# Patient Record
Sex: FEMALE | Race: BLACK OR AFRICAN AMERICAN | NOT HISPANIC OR LATINO | URBAN - METROPOLITAN AREA
[De-identification: names, ages, dates, MRNs, and addresses within clinical notes are randomized per-mention and may not be internally consistent; named-entity substitution may affect disease eponyms.]

---

## 2018-05-01 ENCOUNTER — OFFICE VISIT CONVERTED (OUTPATIENT)
Dept: FAMILY MEDICINE CLINIC | Facility: CLINIC | Age: 33
End: 2018-05-01
Attending: NURSE PRACTITIONER

## 2018-06-22 ENCOUNTER — OFFICE VISIT CONVERTED (OUTPATIENT)
Dept: FAMILY MEDICINE CLINIC | Facility: CLINIC | Age: 33
End: 2018-06-22
Attending: NURSE PRACTITIONER

## 2018-11-27 ENCOUNTER — OFFICE VISIT CONVERTED (OUTPATIENT)
Dept: FAMILY MEDICINE CLINIC | Facility: CLINIC | Age: 33
End: 2018-11-27
Attending: NURSE PRACTITIONER

## 2019-01-22 ENCOUNTER — HOSPITAL ENCOUNTER (OUTPATIENT)
Dept: URGENT CARE | Facility: CLINIC | Age: 34
Discharge: HOME | End: 2019-01-22
Attending: FAMILY MEDICINE

## 2019-02-12 ENCOUNTER — HOSPITAL ENCOUNTER (OUTPATIENT)
Dept: URGENT CARE | Facility: CLINIC | Age: 34
Discharge: HOME | End: 2019-02-12
Attending: EMERGENCY MEDICINE

## 2019-02-14 LAB — BACTERIA UR CULT: NORMAL

## 2019-04-28 ENCOUNTER — HOSPITAL ENCOUNTER (OUTPATIENT)
Dept: URGENT CARE | Facility: CLINIC | Age: 34
Discharge: HOME | End: 2019-04-28

## 2019-09-23 ENCOUNTER — HOSPITAL ENCOUNTER (OUTPATIENT)
Dept: OTHER | Facility: HOSPITAL | Age: 34
Discharge: HOME | End: 2019-09-23
Attending: PHYSICIAN ASSISTANT

## 2019-11-20 ENCOUNTER — HOSPITAL ENCOUNTER (OUTPATIENT)
Dept: FAMILY MEDICINE CLINIC | Facility: CLINIC | Age: 34
Discharge: HOME | End: 2019-11-20
Attending: NURSE PRACTITIONER

## 2019-11-20 ENCOUNTER — OFFICE VISIT CONVERTED (OUTPATIENT)
Dept: FAMILY MEDICINE CLINIC | Facility: CLINIC | Age: 34
End: 2019-11-20
Attending: NURSE PRACTITIONER

## 2019-11-25 LAB
CONV LAST MENSTURAL PERIOD: NORMAL
SPECIMEN SOURCE: NORMAL
SPECIMEN SOURCE: NORMAL
THIN PREP CVX: NORMAL

## 2019-12-31 ENCOUNTER — HOSPITAL ENCOUNTER (OUTPATIENT)
Dept: FAMILY MEDICINE CLINIC | Facility: CLINIC | Age: 34
Discharge: HOME | End: 2019-12-31
Attending: NURSE PRACTITIONER

## 2019-12-31 LAB
25(OH)D3 SERPL-MCNC: 13.1 NG/ML (ref 30–100)
ALBUMIN SERPL-MCNC: 4 G/DL (ref 3.5–5)
ALBUMIN/GLOB SERPL: 1.2 {RATIO} (ref 1.4–2.6)
ALP SERPL-CCNC: 61 U/L (ref 42–98)
ALT SERPL-CCNC: 14 U/L (ref 10–40)
ANION GAP SERPL CALC-SCNC: 15 MMOL/L (ref 8–19)
AST SERPL-CCNC: 16 U/L (ref 15–50)
BASOPHILS # BLD AUTO: 0.06 10*3/UL (ref 0–0.2)
BASOPHILS NFR BLD AUTO: 0.9 % (ref 0–3)
BILIRUB SERPL-MCNC: 0.56 MG/DL (ref 0.2–1.3)
BUN SERPL-MCNC: 7 MG/DL (ref 5–25)
BUN/CREAT SERPL: 10 {RATIO} (ref 6–20)
CALCIUM SERPL-MCNC: 8.9 MG/DL (ref 8.7–10.4)
CHLORIDE SERPL-SCNC: 101 MMOL/L (ref 99–111)
CHOLEST SERPL-MCNC: 77 MG/DL (ref 107–200)
CHOLEST/HDLC SERPL: 2.2 {RATIO} (ref 3–6)
CONV ABS IMM GRAN: 0.01 10*3/UL (ref 0–0.2)
CONV CO2: 25 MMOL/L (ref 22–32)
CONV IMMATURE GRAN: 0.2 % (ref 0–1.8)
CONV TOTAL PROTEIN: 7.4 G/DL (ref 6.3–8.2)
CREAT UR-MCNC: 0.72 MG/DL (ref 0.5–0.9)
DEPRECATED RDW RBC AUTO: 41.3 FL (ref 36.4–46.3)
EOSINOPHIL # BLD AUTO: 0.29 10*3/UL (ref 0–0.7)
EOSINOPHIL # BLD AUTO: 4.5 % (ref 0–7)
ERYTHROCYTE [DISTWIDTH] IN BLOOD BY AUTOMATED COUNT: 11.9 % (ref 11.7–14.4)
GFR SERPLBLD BASED ON 1.73 SQ M-ARVRAT: >60 ML/MIN/{1.73_M2}
GLOBULIN UR ELPH-MCNC: 3.4 G/DL (ref 2–3.5)
GLUCOSE SERPL-MCNC: 104 MG/DL (ref 65–99)
HCT VFR BLD AUTO: 40 % (ref 37–47)
HDLC SERPL-MCNC: 35 MG/DL (ref 40–60)
HGB BLD-MCNC: 13 G/DL (ref 12–16)
LDLC SERPL CALC-MCNC: 33 MG/DL (ref 70–100)
LYMPHOCYTES # BLD AUTO: 2.82 10*3/UL (ref 1–5)
LYMPHOCYTES NFR BLD AUTO: 43.7 % (ref 20–45)
MCH RBC QN AUTO: 30.1 PG (ref 27–31)
MCHC RBC AUTO-ENTMCNC: 32.5 G/DL (ref 33–37)
MCV RBC AUTO: 92.6 FL (ref 81–99)
MONOCYTES # BLD AUTO: 0.33 10*3/UL (ref 0.2–1.2)
MONOCYTES NFR BLD AUTO: 5.1 % (ref 3–10)
NEUTROPHILS # BLD AUTO: 2.94 10*3/UL (ref 2–8)
NEUTROPHILS NFR BLD AUTO: 45.6 % (ref 30–85)
NRBC CBCN: 0 % (ref 0–0.7)
OSMOLALITY SERPL CALC.SUM OF ELEC: 282 MOSM/KG (ref 273–304)
PLATELET # BLD AUTO: 357 10*3/UL (ref 130–400)
PMV BLD AUTO: 9.7 FL (ref 9.4–12.3)
POTASSIUM SERPL-SCNC: 3.7 MMOL/L (ref 3.5–5.3)
RBC # BLD AUTO: 4.32 10*6/UL (ref 4.2–5.4)
SODIUM SERPL-SCNC: 137 MMOL/L (ref 135–147)
TRIGL SERPL-MCNC: 46 MG/DL (ref 40–150)
TSH SERPL-ACNC: 1.72 M[IU]/L (ref 0.27–4.2)
VLDLC SERPL-MCNC: 9 MG/DL (ref 5–37)
WBC # BLD AUTO: 6.45 10*3/UL (ref 4.8–10.8)

## 2020-01-15 ENCOUNTER — HOSPITAL ENCOUNTER (OUTPATIENT)
Dept: URGENT CARE | Facility: CLINIC | Age: 35
Discharge: HOME | End: 2020-01-15
Attending: NURSE PRACTITIONER

## 2020-01-23 ENCOUNTER — HOSPITAL ENCOUNTER (OUTPATIENT)
Dept: CT IMAGING | Facility: HOSPITAL | Age: 35
Discharge: HOME | End: 2020-01-23
Attending: OBSTETRICS & GYNECOLOGY

## 2020-05-28 ENCOUNTER — HOSPITAL ENCOUNTER (OUTPATIENT)
Dept: FAMILY MEDICINE CLINIC | Facility: CLINIC | Age: 35
Discharge: HOME | End: 2020-05-28
Attending: NURSE PRACTITIONER

## 2020-05-28 ENCOUNTER — OFFICE VISIT CONVERTED (OUTPATIENT)
Dept: FAMILY MEDICINE CLINIC | Facility: CLINIC | Age: 35
End: 2020-05-28
Attending: NURSE PRACTITIONER

## 2020-05-28 LAB
C TRACH RRNA CVX QL NAA+PROBE: NOT DETECTED
N GONORRHOEA DNA SPEC QL NAA+PROBE: NOT DETECTED

## 2020-06-22 ENCOUNTER — HOSPITAL ENCOUNTER (OUTPATIENT)
Dept: URGENT CARE | Facility: CLINIC | Age: 35
Discharge: HOME | End: 2020-06-22
Attending: EMERGENCY MEDICINE

## 2020-07-07 LAB
CREAT BLD-MCNC: 0.8 MG/DL (ref 0.6–1.4)
GFR SERPLBLD BASED ON 1.73 SQ M-ARVRAT: >60 ML/MIN/{1.73_M2}

## 2020-07-14 ENCOUNTER — HOSPITAL ENCOUNTER (OUTPATIENT)
Dept: URGENT CARE | Facility: CLINIC | Age: 35
Discharge: HOME | End: 2020-07-14
Attending: PHYSICIAN ASSISTANT

## 2020-08-04 ENCOUNTER — HOSPITAL ENCOUNTER (OUTPATIENT)
Dept: MRI IMAGING | Facility: HOSPITAL | Age: 35
Discharge: HOME | End: 2020-08-04
Attending: INTERNAL MEDICINE

## 2020-08-17 ENCOUNTER — HOSPITAL ENCOUNTER (OUTPATIENT)
Dept: FAMILY MEDICINE CLINIC | Facility: CLINIC | Age: 35
Discharge: HOME | End: 2020-08-17
Attending: NURSE PRACTITIONER

## 2020-08-17 ENCOUNTER — OFFICE VISIT CONVERTED (OUTPATIENT)
Dept: FAMILY MEDICINE CLINIC | Facility: CLINIC | Age: 35
End: 2020-08-17
Attending: NURSE PRACTITIONER

## 2020-08-17 LAB
C TRACH RRNA CVX QL NAA+PROBE: NOT DETECTED
N GONORRHOEA DNA SPEC QL NAA+PROBE: NOT DETECTED

## 2020-08-18 LAB
ALBUMIN SERPL-MCNC: 4.2 G/DL (ref 3.5–5)
ALBUMIN/GLOB SERPL: 1.2 {RATIO} (ref 1.4–2.6)
ALP SERPL-CCNC: 58 U/L (ref 42–98)
ALT SERPL-CCNC: 24 U/L (ref 10–40)
ANION GAP SERPL CALC-SCNC: 17 MMOL/L (ref 8–19)
AST SERPL-CCNC: 27 U/L (ref 15–50)
BASOPHILS # BLD AUTO: 0.03 10*3/UL (ref 0–0.2)
BASOPHILS NFR BLD AUTO: 0.4 % (ref 0–3)
BILIRUB SERPL-MCNC: 0.42 MG/DL (ref 0.2–1.3)
BUN SERPL-MCNC: 7 MG/DL (ref 5–25)
BUN/CREAT SERPL: 9 {RATIO} (ref 6–20)
CALCIUM SERPL-MCNC: 9.6 MG/DL (ref 8.7–10.4)
CHLORIDE SERPL-SCNC: 102 MMOL/L (ref 99–111)
CHOLEST SERPL-MCNC: 83 MG/DL (ref 107–200)
CHOLEST/HDLC SERPL: 2.7 {RATIO} (ref 3–6)
CONV ABS IMM GRAN: 0.01 10*3/UL (ref 0–0.2)
CONV CO2: 25 MMOL/L (ref 22–32)
CONV IMMATURE GRAN: 0.1 % (ref 0–1.8)
CONV TOTAL PROTEIN: 7.7 G/DL (ref 6.3–8.2)
CREAT UR-MCNC: 0.74 MG/DL (ref 0.5–0.9)
DEPRECATED RDW RBC AUTO: 41.2 FL (ref 36.4–46.3)
EOSINOPHIL # BLD AUTO: 0.2 10*3/UL (ref 0–0.7)
EOSINOPHIL # BLD AUTO: 2.8 % (ref 0–7)
ERYTHROCYTE [DISTWIDTH] IN BLOOD BY AUTOMATED COUNT: 11.9 % (ref 11.7–14.4)
GFR SERPLBLD BASED ON 1.73 SQ M-ARVRAT: >60 ML/MIN/{1.73_M2}
GLOBULIN UR ELPH-MCNC: 3.5 G/DL (ref 2–3.5)
GLUCOSE SERPL-MCNC: 96 MG/DL (ref 65–99)
HCT VFR BLD AUTO: 42.5 % (ref 37–47)
HDLC SERPL-MCNC: 31 MG/DL (ref 40–60)
HGB BLD-MCNC: 13.5 G/DL (ref 12–16)
LDLC SERPL CALC-MCNC: 45 MG/DL (ref 70–100)
LYMPHOCYTES # BLD AUTO: 3.09 10*3/UL (ref 1–5)
LYMPHOCYTES NFR BLD AUTO: 43.6 % (ref 20–45)
MCH RBC QN AUTO: 30.2 PG (ref 27–31)
MCHC RBC AUTO-ENTMCNC: 31.8 G/DL (ref 33–37)
MCV RBC AUTO: 95.1 FL (ref 81–99)
MONOCYTES # BLD AUTO: 0.4 10*3/UL (ref 0.2–1.2)
MONOCYTES NFR BLD AUTO: 5.6 % (ref 3–10)
NEUTROPHILS # BLD AUTO: 3.35 10*3/UL (ref 2–8)
NEUTROPHILS NFR BLD AUTO: 47.5 % (ref 30–85)
NRBC CBCN: 0 % (ref 0–0.7)
OSMOLALITY SERPL CALC.SUM OF ELEC: 288 MOSM/KG (ref 273–304)
PLATELET # BLD AUTO: 338 10*3/UL (ref 130–400)
PMV BLD AUTO: 9.8 FL (ref 9.4–12.3)
POTASSIUM SERPL-SCNC: 3.8 MMOL/L (ref 3.5–5.3)
RBC # BLD AUTO: 4.47 10*6/UL (ref 4.2–5.4)
SODIUM SERPL-SCNC: 140 MMOL/L (ref 135–147)
TRIGL SERPL-MCNC: 37 MG/DL (ref 40–150)
TSH SERPL-ACNC: 1.8 M[IU]/L (ref 0.27–4.2)
VLDLC SERPL-MCNC: 7 MG/DL (ref 5–37)
WBC # BLD AUTO: 7.08 10*3/UL (ref 4.8–10.8)

## 2020-09-02 ENCOUNTER — HOSPITAL ENCOUNTER (OUTPATIENT)
Dept: URGENT CARE | Facility: CLINIC | Age: 35
Discharge: HOME | End: 2020-09-02
Attending: NURSE PRACTITIONER

## 2020-10-26 ENCOUNTER — HOSPITAL ENCOUNTER (OUTPATIENT)
Dept: URGENT CARE | Facility: CLINIC | Age: 35
Discharge: HOME | End: 2020-10-26

## 2020-10-26 LAB
CONV HIV-1/ HIV-2: NONREACTIVE
RPR SER QL: NORMAL

## 2020-10-27 LAB
C TRACH RRNA CVX QL NAA+PROBE: NOT DETECTED
N GONORRHOEA DNA SPEC QL NAA+PROBE: NOT DETECTED

## 2020-10-28 LAB
CONV HEPATITIS B SURFACE AG W CONFIRMATION RE: NEGATIVE
HAV IGM SERPL QL IA: NEGATIVE
HBV CORE IGM SERPL QL IA: NEGATIVE
HCV AB SER DONR QL: <0.1 S/CO RATIO (ref 0–0.9)

## 2020-11-18 ENCOUNTER — HOSPITAL ENCOUNTER (OUTPATIENT)
Dept: URGENT CARE | Facility: CLINIC | Age: 35
Discharge: HOME | End: 2020-11-18
Attending: NURSE PRACTITIONER

## 2020-11-20 LAB — SARS-COV-2 RNA SPEC QL NAA+PROBE: NOT DETECTED

## 2021-03-15 ENCOUNTER — HOSPITAL ENCOUNTER (OUTPATIENT)
Dept: URGENT CARE | Facility: CLINIC | Age: 36
Discharge: HOME | End: 2021-03-15
Attending: EMERGENCY MEDICINE

## 2021-04-21 ENCOUNTER — HOSPITAL ENCOUNTER (OUTPATIENT)
Dept: URGENT CARE | Facility: CLINIC | Age: 36
Discharge: HOME | End: 2021-04-21
Attending: FAMILY MEDICINE

## 2021-04-22 LAB — SARS-COV-2 RNA SPEC QL NAA+PROBE: DETECTED

## 2021-05-13 NOTE — PROGRESS NOTES
Progress Note      Patient Name: Renny Cole   Patient ID: [id number]   Sex: Female   YOB: 1985    Primary Care Provider: Pippa SCALES   Referring Provider: Pippa SCALES    Visit Date: August 17, 2020    Provider: LOYDA Olea   Location: [facility]   Location Address: [address] [place] [address], [address]  [id number]   Location Phone: [phone]          Chief Complaint  · follow-up  · vaginal discharge      History Of Present Illness  Renny Cole is a 35 year old /Black female who presents for evaluation and treatment of:      Patient is a 35-year-old female who comes in for annual physical exam.  Last labs were done December 2019 at that time there was no concerning issues.  Patient is never smoked.  She is up-to-date on her depression screening, no other preventative screenings at this time.  She eats a well-balanced diet, she is active on a daily basis.  Blood pressure is elevated today at 142/96, she denies any headache, chest pain, or change in vision.  She states that she is anxious today, she has the ability to check her blood pressure at home and will do that if she notices more elevated than not she will make an appointment.  This is an outlier blood pressure she typically runs within normal limits.    Patient is complaining of new vaginal discharge for 1 week.  She states she has had a new sexual partner and the condom was broken she is concerned of STDs.  She denies any dysuria urgency or frequency.  She denies any burning or itching in the vaginal region.  She states the vaginal discharge is thick and white.  She denies abdominal pain, fever, or chills.       Past Medical History  Disease Name Date Onset Notes   Asthma 03/14/2017 --    Depression --  --    Essential hypertension 03/14/2017 --    Hypertension --  --    STD (female) --  genital herpes, trichomoniasis         Medication List  Name Date Started Instructions   acyclovir  "400 mg oral tablet 2020 take 1 tablet by oral route daily for 90 days   hydrochlorothiazide 25 mg oral tablet 2019 take 1 tablet (25 mg) by oral route once daily for 90 days   Prenatal 28 mg iron- 800 mcg oral tablet 2018 take 1 tablet by oral route daily   Ventolin HFA 90 mcg/actuation inhalation HFA aerosol inhaler 2017 inhale 1 - 2 puffs (90 - 180 mcg) by inhalation route every 4-6 hours as needed         Allergy List  Allergen Name Date Reaction Notes   NO KNOWN DRUG ALLERGIES --  --  --          Reproductive History  Menstrual   Last Menstrual Period: 2016   Pregnancy Summary   Total Pregnancies: 0 Full Term: 0 Premature: 0   Ab Induced: 0 Ab Spontaneous: 0 Ectopics: 0   Multiples: 0 Livin         Social History  Finding Status Start/Stop Quantity Notes   Alcohol Never --/-- --  --    Tobacco Never --/-- --  --          Review of Systems  · Constitutional  o Denies  o : fever, fatigue, weight loss, weight gain  · Eyes  o Denies  o : double vision, impaired vision, blurred vision  · HENT  o Denies  o : headaches, vertigo  · Cardiovascular  o Denies  o : lower extremity edema, claudication, chest pressure, palpitations  · Respiratory  o Denies  o : shortness of breath, wheezing, cough, hemoptysis, dyspnea on exertion  · Gastrointestinal  o Denies  o : nausea, vomiting, diarrhea, constipation, abdominal pain  · Genitourinary  o Admits  o : vaginal discharge  o Denies  o : urgency, frequency, dysuria  · Integument  o Denies  o : rash, itching, pigmentation changes  · Musculoskeletal  o Denies  o : joint pain, joint swelling, muscle pain, muscular weakness  · Psychiatric  o Denies  o : anxiety, depression, suicidal ideation, homicidal ideation      Vitals  Date Time BP Position Site L\R Cuff Size HR RR TEMP (F) WT  HT  BMI kg/m2 BSA m2 O2 Sat HC       2020 08:23 /96 Sitting    84 - R  97.5 217lbs 0oz 5'  8\" 32.99 2.17 98 %          Physical " Examination  · Constitutional  o Appearance  o : well-nourished, well developed, in no acute distress  · Eyes  o Conjunctivae  o : conjunctivae normal, no exudates present  o Sclerae  o : sclerae white  o Pupils and Irises  o : pupils equal and round, and reactive to light and accomodation bilaterally  o Eyelids/Ocular Adnexae  o : extra ocular movements intact  · Respiratory  o Respiratory Effort  o : breathing unlabored, no accessory muscle use  o Inspection of Chest  o : normal appearance, no retractions  o Auscultation of Lungs  o : normal breath sounds bilaterally  · Cardiovascular  o Heart  o :   § Auscultation of Heart  § : regular rate and rhythm, no murmurs, gallops or rubs  o Peripheral Vascular System  o :   § Extremities  § : no edema  · Gastrointestinal  o Abdominal Examination  o : soft, non tender, non distended, with no gaurding, rebound, or rigidity, normal sounds, no masses present, no CVA tenderness  · Neurologic  o Mental Status Examination  o :   § Orientation  § : alert and oriented x3  § Speech/Language  § : normal speech pattern  o Gait and Station  o : normal gait, able to stand without difficulty  · Psychiatric  o Judgment and Insight  o : judgment and insight intact, judgement for everyday activities and social situations within normal limits, insight intact  o Thought Processes  o : rate of thoughts normal, thought content logical  o Mood and Affect  o : mood normal, affect appropriate              Assessment  · Annual physical exam     V70.0/Z00.00  · Vaginal discharge     623.5/N89.8  Will do STD checks  · Elevated blood pressure reading     796.2/R03.0  We will continue to monitor, patient is completely asymptomatic, this is an outlier patient will check at home if starts to become an issue will make an appointment. Patient given guidelines of blood pressure readings.    Problems Reconciled  Plan  · Orders  o Physical, Primary Care Panel (CBC, CMP, Lipid, TSH) [facility] (08312, 12396, 96309,  15769) - 623.5/N89.8, V70.0/Z00.00 - 08/17/2020  o ACO-39: Current medications updated and reviewed () - - 08/17/2020  o GC/Chlamydia by PCR (Urine or Vaginal Swab) [facility] ([facility]) - 623.5/N89.8 - 08/17/2020  o Vaginal pathogens DNA detection panel (66275, 39283, 96838) - 623.5/N89.8 - 08/17/2020  · Medications  o Medications have been Reconciled  o Transition of Care or Provider Policy  · Instructions  o Reviewed health maintenance flowsheet and updated information. Orders were placed and/or patient's response was documented.  o Take all medications as prescribed/directed.  o Patient was educated/instructed on their diagnosis, treatment and medications prior to discharge from the clinic today.  o Call the office with any concerns or questions.  · Disposition  o Call or Return if symptoms worsen or persist.  o Follow up 1 year  o follow up as needed  o call the office with any questions or concerns            Electronically Signed by: LOYDA Olea -Author on August 17, 2020 09:16:57 AM

## 2021-05-13 NOTE — PROGRESS NOTES
Progress Note      Patient Name: Renny Cole   Patient ID: [id number]   Sex: Female   YOB: 1985    Primary Care Provider: Pippa SCALES   Referring Provider: Pippa SCALES    Visit Date: May 28, 2020    Provider: LOYDA Olea   Location: [facility]   Location Address: [address] [place] [address], [address] [place] [address]  [id number]   Location Phone: [phone]          Chief Complaint  · std testing, vag screen      History Of Present Illness  Renny Cole is a 34 year old /Black female who presents for evaluation and treatment of:      Patient is a 34-year-old female who comes in today wanting an STD check vaginal exam.  She has a new partner, she states that they do have unprotected sex.  She denies any new vaginal discharge, lower abdominal pain, dysuria, urgency, or frequency.  She has no other complaints at this time.  Last Pap smear was 11/20/2019 it was normal at that time.       Past Medical History  Disease Name Date Onset Notes   Asthma 03/14/2017 --    Depression --  --    Essential hypertension 03/14/2017 --    Hypertension --  --    STD (female) --  genital herpes, trichomoniasis         Medication List  Name Date Started Instructions   acyclovir 400 mg oral tablet 04/23/2020 TAKE 1 TABLET BY MOUTH EVERY 8 HOURS   hydrochlorothiazide 25 mg oral tablet 11/20/2019 take 1 tablet (25 mg) by oral route once daily for 90 days   Prenatal 28 mg iron- 800 mcg oral tablet 05/01/2018 take 1 tablet by oral route daily   Ventolin HFA 90 mcg/actuation inhalation HFA aerosol inhaler 03/14/2017 inhale 1 - 2 puffs (90 - 180 mcg) by inhalation route every 4-6 hours as needed   Vitamin D2 50,000 unit oral capsule 01/02/2020 take 1 capsule (50,000 unit) by oral route once weekly for 120 days         Allergy List  Allergen Name Date Reaction Notes   NO KNOWN DRUG ALLERGIES --  --  --          Reproductive History  Menstrual   Last Menstrual Period: 06/29/2016   Pregnancy  "Summary   Total Pregnancies: 0 Full Term: 0 Premature: 0   Ab Induced: 0 Ab Spontaneous: 0 Ectopics: 0   Multiples: 0 Livin         Social History  Finding Status Start/Stop Quantity Notes   Alcohol Never --/-- --  --    Tobacco Never --/-- --  --          Review of Systems  · Constitutional  o Denies  o : fever, fatigue, weight loss, weight gain  · Cardiovascular  o Denies  o : lower extremity edema, claudication, chest pressure, palpitations  · Respiratory  o Denies  o : shortness of breath, wheezing, cough, hemoptysis, dyspnea on exertion  · Gastrointestinal  o Denies  o : nausea, vomiting, diarrhea, constipation, abdominal pain  · Genitourinary  o Denies  o : urgency, frequency, dysuria, vaginal discharge      Vitals  Date Time BP Position Site L\R Cuff Size HR RR TEMP (F) WT  HT  BMI kg/m2 BSA m2 O2 Sat        2020 09:22 /88 Sitting    79 - R  97.7 216lbs 2oz 5'  8\" 32.86 2.17 97 %          Physical Examination  · Constitutional  o Appearance  o : well-nourished, well developed, in no acute distress  · Eyes  o Conjunctivae  o : conjunctivae normal, no exudates present  o Sclerae  o : sclerae white  o Pupils and Irises  o : pupils equal and round, and reactive to light and accomodation bilaterally  o Eyelids/Ocular Adnexae  o : extra ocular movements intact  · Respiratory  o Respiratory Effort  o : breathing unlabored, no accessory muscle use  o Inspection of Chest  o : normal appearance, no retractions  o Auscultation of Lungs  o : normal breath sounds bilaterally  · Cardiovascular  o Heart  o :   § Auscultation of Heart  § : regular rate and rhythm, no murmurs, gallops or rubs  · Genitourinary  o FEMALE  EXAM:  o :   §  and rectal exam deferred  § :  and rectal exam deferred  o External Genitalia  o : no inflammation, no lesions present  o Vagina  o : normal vaginal vault, no discharge present, no inflammatory lesions present, no masses present  o Urethra  o :   § Urethral Meatus  § : " no inflammation present  § Urethral Body  § : urethra palpation normal  o Bladder  o : nontender to palpation  o Cervix  o : appearance healthy, no lesions present, nontender to palpation, no discharges, no bleeding present, normal midline position  o Uterus  o : nontender to palpation, no masses present, position midline/midplane  o Adnexa  o : no tenderness or masses present on bimanual examination  o Anus  o : no inflammation or lesions present              Assessment  · STD exposure     V01.6/Z20.2    Problems Reconciled  Plan  · Orders  o Vag Screen Infectious agent detection by nucleic acid DNA or RNA (96417) - V01.6/Z20.2 - 05/28/2020  o GC/Chlamydia by PCR (Urine or Vaginal Swab) [facility] ([facility]) - V01.6/Z20.2 - 05/28/2020  o ACO-39: Current medications updated and reviewed () - - 05/28/2020  o ACO-18: Positive screen for clinical depression using a standardized tool and a follow-up plan documented () - - 05/28/2020   positive 8 pts.  · Instructions  o Patient was educated/instructed on their diagnosis, treatment and medications prior to discharge from the clinic today.  o Call the office with any concerns or questions.  · Disposition  o Call or Return if symptoms worsen or persist.  o follow up as needed  o call the office with any questions or concerns            Electronically Signed by: Pippa Yeboah APRN -Author on May 28, 2020 10:07:44 AM

## 2021-05-15 VITALS
SYSTOLIC BLOOD PRESSURE: 130 MMHG | WEIGHT: 216.12 LBS | TEMPERATURE: 97.7 F | OXYGEN SATURATION: 97 % | BODY MASS INDEX: 32.75 KG/M2 | HEART RATE: 79 BPM | DIASTOLIC BLOOD PRESSURE: 88 MMHG | HEIGHT: 68 IN

## 2021-05-15 VITALS
TEMPERATURE: 98.5 F | OXYGEN SATURATION: 97 % | DIASTOLIC BLOOD PRESSURE: 88 MMHG | WEIGHT: 213.25 LBS | HEART RATE: 82 BPM | SYSTOLIC BLOOD PRESSURE: 144 MMHG | BODY MASS INDEX: 32.32 KG/M2 | HEIGHT: 68 IN

## 2021-05-15 VITALS
TEMPERATURE: 97.5 F | BODY MASS INDEX: 32.89 KG/M2 | DIASTOLIC BLOOD PRESSURE: 96 MMHG | OXYGEN SATURATION: 98 % | SYSTOLIC BLOOD PRESSURE: 142 MMHG | HEIGHT: 68 IN | HEART RATE: 84 BPM | WEIGHT: 217 LBS

## 2021-05-16 VITALS
DIASTOLIC BLOOD PRESSURE: 88 MMHG | HEART RATE: 79 BPM | SYSTOLIC BLOOD PRESSURE: 132 MMHG | BODY MASS INDEX: 32.05 KG/M2 | TEMPERATURE: 98.4 F | RESPIRATION RATE: 16 BRPM | HEIGHT: 68 IN | OXYGEN SATURATION: 98 % | WEIGHT: 211.5 LBS

## 2021-05-16 VITALS
OXYGEN SATURATION: 96 % | TEMPERATURE: 98.3 F | BODY MASS INDEX: 31.24 KG/M2 | WEIGHT: 206.12 LBS | DIASTOLIC BLOOD PRESSURE: 80 MMHG | RESPIRATION RATE: 16 BRPM | SYSTOLIC BLOOD PRESSURE: 128 MMHG | HEIGHT: 68 IN | HEART RATE: 81 BPM

## 2021-05-16 VITALS
OXYGEN SATURATION: 99 % | SYSTOLIC BLOOD PRESSURE: 118 MMHG | HEART RATE: 81 BPM | WEIGHT: 214.37 LBS | BODY MASS INDEX: 32.49 KG/M2 | DIASTOLIC BLOOD PRESSURE: 86 MMHG | RESPIRATION RATE: 16 BRPM | TEMPERATURE: 98.5 F | HEIGHT: 68 IN

## 2021-07-20 RX ORDER — ACYCLOVIR 400 MG/1
TABLET ORAL
Qty: 90 TABLET | Refills: 0 | Status: SHIPPED | OUTPATIENT
Start: 2021-07-20 | End: 2021-10-12 | Stop reason: SDUPTHER

## 2021-08-12 PROCEDURE — U0003 INFECTIOUS AGENT DETECTION BY NUCLEIC ACID (DNA OR RNA); SEVERE ACUTE RESPIRATORY SYNDROME CORONAVIRUS 2 (SARS-COV-2) (CORONAVIRUS DISEASE [COVID-19]), AMPLIFIED PROBE TECHNIQUE, MAKING USE OF HIGH THROUGHPUT TECHNOLOGIES AS DESCRIBED BY CMS-2020-01-R: HCPCS | Performed by: PHYSICIAN ASSISTANT

## 2021-08-14 ENCOUNTER — TELEPHONE (OUTPATIENT)
Dept: URGENT CARE | Facility: CLINIC | Age: 36
End: 2021-08-14

## 2021-08-14 NOTE — TELEPHONE ENCOUNTER
Unable to reach patient at phone numbers listed in chart.  If you are unable to find another number, please send a certified letter for patient.

## 2021-09-16 ENCOUNTER — OFFICE VISIT (OUTPATIENT)
Dept: FAMILY MEDICINE CLINIC | Facility: CLINIC | Age: 36
End: 2021-09-16

## 2021-09-16 VITALS
BODY MASS INDEX: 32.34 KG/M2 | WEIGHT: 213.4 LBS | HEART RATE: 59 BPM | OXYGEN SATURATION: 100 % | DIASTOLIC BLOOD PRESSURE: 88 MMHG | SYSTOLIC BLOOD PRESSURE: 138 MMHG | TEMPERATURE: 96.7 F | HEIGHT: 68 IN

## 2021-09-16 DIAGNOSIS — Z01.419 ROUTINE GYNECOLOGICAL EXAMINATION: ICD-10-CM

## 2021-09-16 DIAGNOSIS — Z11.3 SCREENING FOR STDS (SEXUALLY TRANSMITTED DISEASES): Primary | ICD-10-CM

## 2021-09-16 LAB
C TRACH RRNA CVX QL NAA+PROBE: NOT DETECTED
CANDIDA SPECIES: NEGATIVE
GARDNERELLA VAGINALIS: POSITIVE
N GONORRHOEA RRNA SPEC QL NAA+PROBE: NOT DETECTED
T VAGINALIS DNA VAG QL PROBE+SIG AMP: POSITIVE

## 2021-09-16 PROCEDURE — 99395 PREV VISIT EST AGE 18-39: CPT | Performed by: NURSE PRACTITIONER

## 2021-09-16 PROCEDURE — G0148 SCR C/V CYTO, AUTOSYS, RESCR: HCPCS | Performed by: NURSE PRACTITIONER

## 2021-09-16 PROCEDURE — 2014F MENTAL STATUS ASSESS: CPT | Performed by: NURSE PRACTITIONER

## 2021-09-16 PROCEDURE — 87510 GARDNER VAG DNA DIR PROBE: CPT | Performed by: NURSE PRACTITIONER

## 2021-09-16 PROCEDURE — 87491 CHLMYD TRACH DNA AMP PROBE: CPT | Performed by: NURSE PRACTITIONER

## 2021-09-16 PROCEDURE — 87480 CANDIDA DNA DIR PROBE: CPT | Performed by: NURSE PRACTITIONER

## 2021-09-16 PROCEDURE — 87624 HPV HI-RISK TYP POOLED RSLT: CPT | Performed by: NURSE PRACTITIONER

## 2021-09-16 PROCEDURE — 3008F BODY MASS INDEX DOCD: CPT | Performed by: NURSE PRACTITIONER

## 2021-09-16 PROCEDURE — 87660 TRICHOMONAS VAGIN DIR PROBE: CPT | Performed by: NURSE PRACTITIONER

## 2021-09-16 PROCEDURE — 87591 N.GONORRHOEAE DNA AMP PROB: CPT | Performed by: NURSE PRACTITIONER

## 2021-09-16 RX ORDER — METRONIDAZOLE 500 MG/1
2000 TABLET ORAL ONCE
Qty: 4 TABLET | Refills: 0 | Status: SHIPPED | OUTPATIENT
Start: 2021-09-16 | End: 2021-09-16

## 2021-09-16 RX ORDER — METRONIDAZOLE 500 MG/1
500 TABLET ORAL 2 TIMES DAILY
Qty: 10 TABLET | Refills: 0 | Status: SHIPPED | OUTPATIENT
Start: 2021-09-17 | End: 2021-09-22

## 2021-09-16 NOTE — PROGRESS NOTES
"Chief Complaint  Gynecologic Exam    Subjective          Renny Cole presents to [facility]    ENCOUNTER DATE:  09/16/2021    Renny Cole / 36 y.o. / female      CHIEF COMPLAINT    Gynecologic Exam      VITALS    /88 (BP Location: Left arm, Patient Position: Sitting, Cuff Size: Adult)   Pulse 59   Temp 96.7 °F (35.9 °C) (Temporal)   Ht 172.7 cm (68\")   Wt 96.8 kg (213 lb 6.4 oz)   SpO2 100%   BMI 32.45 kg/m²     BP Readings from Last 3 Encounters:  09/16/21 : 138/88  08/12/21 : 140/86  08/17/20 : 142/96    Wt Readings from Last 3 Encounters:  09/16/21 : 96.8 kg (213 lb 6.4 oz)  08/12/21 : 85.7 kg (189 lb)  08/17/20 : 98.4 kg (217 lb)    Body mass index is 32.45 kg/m².    MEDICATIONS    Current Outpatient Medications on File Prior to Visit:  (DISCONTINUED) acyclovir (ZOVIRAX) 400 MG tablet, TAKE 1 TABLET BY MOUTH DAILY, Disp: 90 tablet, Rfl: 0  (DISCONTINUED) brompheniramine-pseudoephedrine-DM 30-2-10 MG/5ML syrup, Take 5 mL by mouth 4 (Four) Times a Day As Needed for Allergies., Disp: 118 mL, Rfl: 0    HISTORY OF PRESENT ILLNESS    Renny presents for annual health maintenance visit.    Last health maintenance visit: approximately 1 year ago  General health: good  Lifestyle:  Attempting to lose weight?: Yes   Diet: eats moderately healthy  Exercise: walks regularly  Tobacco: Never used   Alcohol: does not drink  Work: Full-time  Reproductive health:  Sexually active?: Yes   Sexual problems?: No problems  Concern for STD?: Yes    Sees Gynecologist?: No   Korin/Postmenopausal?: No   Domestic abuse concerns: No   Depression Screening:   No flowsheet data found.     PHQ-2: 0 (Not depressed)  PHQ-9: 0 (Negative screening for depression)    Patient Care Team:  Pippa Yeboah APRN as PCP - General (Nurse Practitioner)      ALLERGIES  No Known Allergies     PFSH:     The following portions of the patient's history were reviewed and updated as appropriate: " Allergies / Current Medications / Past Medical History / Surgical History / Social History / Family History    PROBLEM LIST   There is no problem list on file for this patient.      PAST MEDICAL HISTORY  Past Medical History:  03/14/2017: Asthma  No date: Depression  03/14/2017: Essential hypertension  No date: STD (female)     Comment:  GENITAL HERPES; TRICHOMONIASIS    SURGICAL HISTORY  No past surgical history on file.    SOCIAL HISTORY  Social History   Socioeconomic History     Marital status: Single     Spouse name: Not on file     Number of children: Not on file     Years of education: Not on file     Highest education level: Not on file   Tobacco Use     Smoking status: Never Smoker     Smokeless tobacco: Never Used   Vaping Use     Vaping Use: Never used   Substance and Sexual Activity     Alcohol use: Never     Drug use: Never        IMMUNIZATION HISTORY    Immunization History  Administered            Date(s) Administered   COVID-19 (PFIZER)     08/02/2021     MMR                   08/15/1996     Td                    04/12/1999          HEALTHCARE MAINTENANCE ISSUES    Cancer Screening:  Colon: Initial/Next screening at age: N/A  Repeat colon cancer screening: N/A at this time  Breast: Recommended monthly self exams; annual professional exam  Mammogram: N/A at this time  Cervical: 3 years  Skin: Monthly self skin examination, annual exam by health professional      Lifestyle Counseling:  Lifestyle Modifications: Continue good lifestyle choices/modifications  Safety Issues: Always wear seatbelt, Avoid texting while driving   Use sunscreen, regular skin examination  Recommended annual dental/vision examination.  Emotional/Stress/Sleep: Reviewed and  given when appropriate    Gynecologic Exam  The patient's pertinent negatives include no genital itching, genital rash or vaginal discharge. She is sexually active. It is unknown whether or not her partner has an STD. She uses nothing for contraception.  "Her menstrual history has been regular. Her past medical history is significant for menorrhagia. There is no history of an ectopic pregnancy or endometriosis.       Objective   Vital Signs:   /88 (BP Location: Left arm, Patient Position: Sitting, Cuff Size: Adult)   Pulse 59   Temp 96.7 °F (35.9 °C) (Temporal)   Ht 172.7 cm (68\")   Wt 96.8 kg (213 lb 6.4 oz)   SpO2 100%   BMI 32.45 kg/m²     Physical Exam  Vitals reviewed. Exam conducted with a chaperone present (Manda Barr).   Constitutional:       Appearance: Normal appearance. She is well-developed.   HENT:      Head: Normocephalic and atraumatic.   Eyes:      Conjunctiva/sclera: Conjunctivae normal.      Pupils: Pupils are equal, round, and reactive to light.   Cardiovascular:      Rate and Rhythm: Normal rate and regular rhythm.      Heart sounds: No murmur heard.   No friction rub.   Pulmonary:      Effort: Pulmonary effort is normal.      Breath sounds: Normal breath sounds. No wheezing or rhonchi.   Genitourinary:     Exam position: Knee-chest position.      Vagina: Normal.      Cervix: Normal.      Uterus: Normal.       Adnexa: Right adnexa normal and left adnexa normal.   Skin:     General: Skin is warm and dry.   Neurological:      Mental Status: She is alert and oriented to person, place, and time.   Psychiatric:         Mood and Affect: Mood and affect normal.         Behavior: Behavior normal.         Thought Content: Thought content normal.         Judgment: Judgment normal.        Result Review :   The following data was reviewed by: LOYDA Stephens on 09/16/2021:                          Assessment and Plan    Diagnoses and all orders for this visit:    1. Screening for STDs (sexually transmitted diseases) (Primary)  -     Cancel: Chlamydia trachomatis, Neisseria gonorrhoeae, PCR - Swab, Cervix; Future  -     Chlamydia trachomatis, Neisseria gonorrhoeae, PCR - Urine, Urine, Clean Catch  -     Gardnerella vaginalis, " Trichomonas vaginalis, Candida albicans, DNA - Swab, Vagina    2. Routine gynecological examination  -     IgP, Aptima HPV        Follow Up   No follow-ups on file.  Patient was given instructions and counseling regarding her condition or for health maintenance advice. Please see specific information pulled into the AVS if appropriate.

## 2021-09-20 LAB
CYTOLOGIST CVX/VAG CYTO: NORMAL
CYTOLOGY CVX/VAG DOC CYTO: NORMAL
CYTOLOGY CVX/VAG DOC THIN PREP: NORMAL
DX ICD CODE: NORMAL
HIV 1 & 2 AB SER-IMP: NORMAL
HPV I/H RISK 4 DNA CVX QL PROBE+SIG AMP: NEGATIVE
OTHER STN SPEC: NORMAL
STAT OF ADQ CVX/VAG CYTO-IMP: NORMAL

## 2021-09-23 ENCOUNTER — TELEPHONE (OUTPATIENT)
Dept: FAMILY MEDICINE CLINIC | Facility: CLINIC | Age: 36
End: 2021-09-23

## 2021-09-23 NOTE — TELEPHONE ENCOUNTER
Caller: Renny Cole    Relationship: Self    Best call back number: [phone]    What is the best time to reach you: ANYTIME    Who are you requesting to speak with (clinical staff, provider,  specific staff member): NURSE    What was the call regarding: PATIENT WOULD LIKE TO KNOW THE RESULTS OF TEST/LAB     Do you require a callback: YES

## 2021-10-12 RX ORDER — ACYCLOVIR 400 MG/1
TABLET ORAL
Qty: 90 TABLET | Refills: 0 | Status: SHIPPED | OUTPATIENT
Start: 2021-10-12 | End: 2021-11-08 | Stop reason: SDUPTHER

## 2021-11-08 ENCOUNTER — TELEPHONE (OUTPATIENT)
Dept: FAMILY MEDICINE CLINIC | Facility: CLINIC | Age: 36
End: 2021-11-08

## 2021-11-08 RX ORDER — HYDROCHLOROTHIAZIDE 25 MG/1
25 TABLET ORAL DAILY
Qty: 90 TABLET | Refills: 0 | Status: SHIPPED | OUTPATIENT
Start: 2021-11-08 | End: 2022-02-07 | Stop reason: SDUPTHER

## 2021-11-08 RX ORDER — ACYCLOVIR 400 MG/1
TABLET ORAL
Qty: 90 TABLET | Refills: 0 | OUTPATIENT
Start: 2021-11-08

## 2021-11-08 RX ORDER — ACYCLOVIR 400 MG/1
400 TABLET ORAL DAILY
Qty: 90 TABLET | Refills: 0 | Status: SHIPPED | OUTPATIENT
Start: 2021-11-08 | End: 2022-02-09

## 2021-11-08 NOTE — TELEPHONE ENCOUNTER
Caller: Renny Cole    Relationship: Self       acyclovir (ZOVIRAX) 400 MG tablet   hydroCHLOROthiazide (HYDRODIURIL) 25 MG tablet    Pharmacy where request should be sent: [facility]     Additional details provided by patient: PATIENT STATES SHE WENT OUT OF TOWN AND HAD TO GET RID OF     acyclovir (ZOVIRAX) 400 MG tablet       Best call back number: [phone]     Does the patient have less than a 3 day supply:  [x] Yes  [] No

## 2021-12-10 ENCOUNTER — OFFICE VISIT (OUTPATIENT)
Dept: FAMILY MEDICINE CLINIC | Facility: CLINIC | Age: 36
End: 2021-12-10

## 2021-12-10 VITALS
BODY MASS INDEX: 31.98 KG/M2 | HEART RATE: 77 BPM | OXYGEN SATURATION: 97 % | TEMPERATURE: 97.1 F | DIASTOLIC BLOOD PRESSURE: 76 MMHG | HEIGHT: 68 IN | WEIGHT: 211 LBS | SYSTOLIC BLOOD PRESSURE: 128 MMHG

## 2021-12-10 DIAGNOSIS — N89.8 VAGINAL DISCHARGE: ICD-10-CM

## 2021-12-10 DIAGNOSIS — R39.9 URINARY SYMPTOM OR SIGN: Primary | ICD-10-CM

## 2021-12-10 DIAGNOSIS — Z20.2 STD EXPOSURE: ICD-10-CM

## 2021-12-10 LAB
BILIRUB BLD-MCNC: NEGATIVE MG/DL
CANDIDA SPECIES: NEGATIVE
CLARITY, POC: CLEAR
COLOR UR: YELLOW
EXPIRATION DATE: ABNORMAL
GARDNERELLA VAGINALIS: NEGATIVE
GLUCOSE UR STRIP-MCNC: NEGATIVE MG/DL
KETONES UR QL: NEGATIVE
LEUKOCYTE EST, POC: ABNORMAL
Lab: ABNORMAL
NITRITE UR-MCNC: NEGATIVE MG/ML
PH UR: 6 [PH] (ref 5–8)
PROT UR STRIP-MCNC: NEGATIVE MG/DL
RBC # UR STRIP: NEGATIVE /UL
SP GR UR: 1.02 (ref 1–1.03)
T VAGINALIS DNA VAG QL PROBE+SIG AMP: NEGATIVE
UROBILINOGEN UR QL: NORMAL

## 2021-12-10 PROCEDURE — 99213 OFFICE O/P EST LOW 20 MIN: CPT | Performed by: NURSE PRACTITIONER

## 2021-12-10 PROCEDURE — 87660 TRICHOMONAS VAGIN DIR PROBE: CPT | Performed by: NURSE PRACTITIONER

## 2021-12-10 PROCEDURE — 87491 CHLMYD TRACH DNA AMP PROBE: CPT | Performed by: NURSE PRACTITIONER

## 2021-12-10 PROCEDURE — 87086 URINE CULTURE/COLONY COUNT: CPT | Performed by: NURSE PRACTITIONER

## 2021-12-10 PROCEDURE — 87591 N.GONORRHOEAE DNA AMP PROB: CPT | Performed by: NURSE PRACTITIONER

## 2021-12-10 PROCEDURE — 87480 CANDIDA DNA DIR PROBE: CPT | Performed by: NURSE PRACTITIONER

## 2021-12-10 PROCEDURE — 87510 GARDNER VAG DNA DIR PROBE: CPT | Performed by: NURSE PRACTITIONER

## 2021-12-10 NOTE — PROGRESS NOTES
"Chief Complaint  Vaginal Discharge    Subjective        Social History     Socioeconomic History   • Marital status: Single   Tobacco Use   • Smoking status: Never Smoker   • Smokeless tobacco: Never Used   Vaping Use   • Vaping Use: Never used   Substance and Sexual Activity   • Alcohol use: Never   • Drug use: Never   • Sexual activity: Yes     Partners: Male     Birth control/protection: None       Past Medical History:   Diagnosis Date   • Asthma    • Asthma 03/14/2017   • Depression    • Essential hypertension 03/14/2017   • Hypertension    • STD (female)     GENITAL HERPES; TRICHOMONIASIS     Past Medical History:   Diagnosis Date   • Asthma    • Asthma 03/14/2017   • Depression    • Essential hypertension 03/14/2017   • Hypertension    • STD (female)     GENITAL HERPES; TRICHOMONIASIS       Renny Cole presents to [facility]  Vaginal Discharge  The patient's primary symptoms include vaginal discharge. The patient's pertinent negatives include no genital itching, genital odor or missed menses. This is a new problem. The current episode started 1 to 4 weeks ago. The problem has been waxing and waning. The patient is experiencing no pain. The vaginal discharge was white and milky. There has been no bleeding. She has not been passing clots. She has not been passing tissue. She is sexually active. It is possible that her partner has an STD. She uses nothing for contraception. Her menstrual history has been regular.       Objective   Vital Signs:   /76   Pulse 77   Temp 97.1 °F (36.2 °C)   Ht 172.7 cm (68\")   Wt 95.7 kg (211 lb)   SpO2 97%   BMI 32.08 kg/m²     Physical Exam  Vitals reviewed.   Constitutional:       Appearance: Normal appearance. She is well-developed.   HENT:      Head: Normocephalic and atraumatic.   Eyes:      Conjunctiva/sclera: Conjunctivae normal.      Pupils: Pupils are equal, round, and reactive to light.   Cardiovascular:      Rate and " Rhythm: Normal rate and regular rhythm.      Heart sounds: No murmur heard.      Pulmonary:      Effort: Pulmonary effort is normal.      Breath sounds: Normal breath sounds. No wheezing or rhonchi.   Skin:     General: Skin is warm and dry.   Neurological:      Mental Status: She is alert and oriented to person, place, and time.   Psychiatric:         Mood and Affect: Mood and affect normal.         Behavior: Behavior normal.         Thought Content: Thought content normal.         Judgment: Judgment normal.        Result Review :   The following data was reviewed by: LOYDA Stephens on 12/10/2021:                Assessment and Plan    Diagnoses and all orders for this visit:    1. Urinary symptom or sign (Primary)  -     POCT urinalysis dipstick, automated  -     Urine Culture - Urine, Urine, Clean Catch    2. Vaginal discharge  Comments:  milky and white, no odor, will check STD and vaginal panel  Orders:  -     Gardnerella vaginalis, Trichomonas vaginalis, Candida albicans, DNA - Swab, Vagina    3. STD exposure  Comments:  will do swab to R/o any STD  Orders:  -     Gardnerella vaginalis, Trichomonas vaginalis, Candida albicans, DNA - Swab, Vagina  -     Chlamydia trachomatis, Neisseria gonorrhoeae, PCR - Urine, Urine, Clean Catch        Follow Up   Return if symptoms worsen or fail to improve.  Patient was given instructions and counseling regarding her condition or for health maintenance advice. Please see specific information pulled into the AVS if appropriate.

## 2021-12-11 LAB
C TRACH RRNA CVX QL NAA+PROBE: NOT DETECTED
N GONORRHOEA RRNA SPEC QL NAA+PROBE: NOT DETECTED

## 2021-12-13 ENCOUNTER — TELEPHONE (OUTPATIENT)
Dept: FAMILY MEDICINE CLINIC | Facility: CLINIC | Age: 36
End: 2021-12-13

## 2021-12-13 LAB
BACTERIA UR CULT: NORMAL
BACTERIA UR CULT: NORMAL

## 2021-12-13 NOTE — TELEPHONE ENCOUNTER
Caller: Renny Cole    Relationship: Self    Best call back number: [phone]    What was the call regarding: PATIENT WAS CALLING IN ABOUT HER RESULTS FROM HER LABS ON THE 10TH.     HER PHONE WAS DEAD AND SHE WANTS TO MAKE SURE SHE DIDN'T MISS A CALL.    Do you require a callback: YES, PLEASE CALL BACK AND ADVISE.

## 2022-02-07 RX ORDER — HYDROCHLOROTHIAZIDE 25 MG/1
25 TABLET ORAL DAILY
Qty: 90 TABLET | Refills: 0 | Status: SHIPPED | OUTPATIENT
Start: 2022-02-07

## 2022-02-09 RX ORDER — ACYCLOVIR 400 MG/1
TABLET ORAL
Qty: 90 TABLET | Refills: 0 | Status: SHIPPED | OUTPATIENT
Start: 2022-02-09 | End: 2022-06-02

## 2022-06-02 ENCOUNTER — TRANSCRIBE ORDERS (OUTPATIENT)
Dept: ADMINISTRATIVE | Facility: HOSPITAL | Age: 37
End: 2022-06-02

## 2022-06-02 DIAGNOSIS — D49.7 PITUITARY TUMOR: Primary | ICD-10-CM

## 2022-06-02 RX ORDER — ACYCLOVIR 400 MG/1
TABLET ORAL
Qty: 90 TABLET | Refills: 0 | Status: SHIPPED | OUTPATIENT
Start: 2022-06-02 | End: 2022-09-06

## 2022-06-08 ENCOUNTER — APPOINTMENT (OUTPATIENT)
Dept: MRI IMAGING | Facility: HOSPITAL | Age: 37
End: 2022-06-08

## 2022-06-30 ENCOUNTER — APPOINTMENT (OUTPATIENT)
Dept: MRI IMAGING | Facility: HOSPITAL | Age: 37
End: 2022-06-30

## 2022-07-06 ENCOUNTER — HOSPITAL ENCOUNTER (OUTPATIENT)
Dept: MRI IMAGING | Facility: HOSPITAL | Age: 37
Discharge: HOME | End: 2022-07-06
Admitting: INTERNAL MEDICINE

## 2022-07-06 DIAGNOSIS — D49.7 PITUITARY TUMOR: ICD-10-CM

## 2022-07-06 LAB
CREAT BLDA-MCNC: 0.7 MG/DL
EGFRCR SERPLBLD CKD-EPI 2021: 114.4 ML/MIN/1.73

## 2022-07-06 PROCEDURE — 0 GADOBENATE DIMEGLUMINE 529 MG/ML SOLUTION: Performed by: INTERNAL MEDICINE

## 2022-07-06 PROCEDURE — A9577 INJ MULTIHANCE: HCPCS | Performed by: INTERNAL MEDICINE

## 2022-07-06 PROCEDURE — 82565 ASSAY OF CREATININE: CPT

## 2022-07-06 PROCEDURE — 70553 MRI BRAIN STEM W/O & W/DYE: CPT

## 2022-07-06 RX ADMIN — GADOBENATE DIMEGLUMINE 20 ML: 529 INJECTION, SOLUTION INTRAVENOUS at 13:42

## 2022-08-10 ENCOUNTER — OFFICE VISIT (OUTPATIENT)
Dept: PODIATRY | Facility: CLINIC | Age: 37
End: 2022-08-10

## 2022-08-10 VITALS
DIASTOLIC BLOOD PRESSURE: 88 MMHG | SYSTOLIC BLOOD PRESSURE: 139 MMHG | HEIGHT: 68 IN | BODY MASS INDEX: 31.64 KG/M2 | OXYGEN SATURATION: 100 % | TEMPERATURE: 97.1 F | WEIGHT: 208.8 LBS | HEART RATE: 66 BPM

## 2022-08-10 DIAGNOSIS — M72.2 PLANTAR FASCIITIS: Primary | ICD-10-CM

## 2022-08-10 DIAGNOSIS — M79.671 FOOT PAIN, RIGHT: ICD-10-CM

## 2022-08-10 PROCEDURE — 99203 OFFICE O/P NEW LOW 30 MIN: CPT | Performed by: PODIATRIST

## 2022-08-10 RX ORDER — METHYLPREDNISOLONE 4 MG/1
TABLET ORAL
Qty: 21 TABLET | Refills: 0 | Status: SHIPPED | OUTPATIENT
Start: 2022-08-10 | End: 2022-11-28

## 2022-08-10 RX ORDER — DICLOFENAC SODIUM 75 MG/1
75 TABLET, DELAYED RELEASE ORAL 2 TIMES DAILY
Qty: 60 TABLET | Refills: 1 | Status: SHIPPED | OUTPATIENT
Start: 2022-08-10 | End: 2022-10-03

## 2022-08-10 NOTE — PROGRESS NOTES
[facility] - PODIATRY    Today's Date: 08/10/22    Patient Name: Renny Cole  MRN: [id number]  CSN: [id number]  PCP: Pippa Yeboah APRN  Referring Provider: Referring, Self    SUBJECTIVE     Chief Complaint   Patient presents with   • Right Foot - Establish Care, Pain     HPI: Renny Cole, a 37 y.o.female, comes to clinic.    New, Established, New Problem:  new    Location:  Right arch    Duration:  May 2022    Onset:  gradual    Nature:  achy, sharp, shooting    Stable, worsening, improving:  worsening    Aggravating factors:  Patient describes morning right heel pain as stabbing, burning, or aching. This pain usually subsides throughout the day, however it returns after periods of rest and sitting, when standing back up on their feet, and again the next morning.      Previous Treatment:  Wrapping, epsom salt soaks    Patient denies any fevers, chills, nausea, vomiting, shortness of breath, nor any other constitutional signs nor symptoms.    No other pedal complaints at this time.    Past Medical History:   Diagnosis Date   • Asthma    • Asthma 03/14/2017   • Depression    • Essential hypertension 03/14/2017   • Hypertension    • PCOS (polycystic ovarian syndrome)    • STD (female)     GENITAL HERPES; TRICHOMONIASIS     History reviewed. No pertinent surgical history.  Family History   Problem Relation Age of Onset   • Arthritis Mother    • Asthma Mother    • Hypertension Mother    • Arthritis Father    • Asthma Father    • Hypertension Father    • Cancer Maternal Grandmother         unknown   • Stroke Neg Hx    • Diabetes Neg Hx      Social History     Socioeconomic History   • Marital status: Single   Tobacco Use   • Smoking status: Never Smoker   • Smokeless tobacco: Never Used   Vaping Use   • Vaping Use: Never used   Substance and Sexual Activity   • Alcohol use: Never   • Drug use: Never   • Sexual activity: Defer     No Known Allergies  Current Outpatient Medications    Medication Sig Dispense Refill   • acyclovir (ZOVIRAX) 400 MG tablet TAKE 1 TABLET BY MOUTH DAILY. NO MORE THAN 5 DOSES A DAY 90 tablet 0   • hydroCHLOROthiazide (HYDRODIURIL) 25 MG tablet TAKE 1 TABLET BY MOUTH DAILY 90 tablet 0   • medroxyPROGESTERone (PROVERA) 10 MG tablet      • omeprazole (priLOSEC) 20 MG capsule Take 1 tablet twice a day for 7 days, then take 1 tablet each day as needed for the gastritis or heartburn. 35 capsule 0   • diclofenac (VOLTAREN) 75 MG EC tablet Take 1 tablet by mouth 2 (Two) Times a Day for 30 days. 60 tablet 1   • methylPREDNISolone (MEDROL) 4 MG dose pack Take as directed 21 tablet 0     No current facility-administered medications for this visit.     Review of Systems   Constitutional: Negative.    Musculoskeletal:        Right heel pain   All other systems reviewed and are negative.      OBJECTIVE     Vitals:    08/10/22 1512   BP: 139/88   Pulse: 66   Temp: 97.1 °F (36.2 °C)   SpO2: 100%       PHYSICAL EXAM     Foot/Ankle Exam:       General:   Appearance: obesity    Orientation: AAOx3    Affect: appropriate    Gait: unimpaired      VASCULAR      Right Foot Vascularity   Normal vascular exam    Dorsalis pedis:  2+  Posterior tibial:  2+  Skin Temperature: warm    Edema Grading:  None  CFT:  < 3 seconds  Pedal Hair Growth:  Present  Varicosities: none       Left Foot Vascularity   Normal vascular exam    Dorsalis pedis:  2+  Posterior tibial:  2+  Skin Temperature: warm    Edema Grading:  None  CFT:  < 3 seconds  Pedal Hair Growth:  Present  Varicosities: none        NEUROLOGIC     Right Foot Neurologic   Normal sensation    Light touch sensation:  Normal  Vibratory sensation:  Normal  Hot/Cold sensation: normal    Protective Sensation using [place] Monofilament:  10     Left Foot Neurologic   Normal sensation    Light touch sensation:  Normal  Vibratory sensation:  Normal  Hot/cold sensation: normal    Protective Sensation using [place] Monofilament:  10      MUSCULOSKELETAL      Right Foot Musculoskeletal   Tenderness: plantar fascia and plantar heel    Arch:  Pes planus     Left Foot Musculoskeletal   Arch:  Pes planus     MUSCLE STRENGTH     Right Foot Muscle Strength   Foot dorsiflexion:  4  Foot plantar flexion:  4  Foot inversion:  4  Foot eversion:  4     Left Foot Muscle Strength   Foot dorsiflexion:  4  Foot plantar flexion:  4  Foot inversion:  4  Foot eversion:  4     RANGE OF MOTION      Right Foot Range of Motion   Foot and ankle ROM within normal limits       Left Foot Range of Motion   Foot and ankle ROM within normal limits       DERMATOLOGIC     Right Foot Dermatologic   Skin: skin intact    Nails: normal       Left Foot Dermatologic   Skin: skin intact    Nails: normal        RADIOLOGY:    PROCEDURE:  XR FOOT 3+ VW RIGHT     COMPARISON: None     INDICATIONS:  Tenderness over first MCP joint     FINDINGS:          Two plain film images of the right foot reveals that there is an old appearing fracture of the   medial sesamoid bone along the plantar surface of the distal head of the right 1st metatarsal.  No   evidence of acute fracture or dislocation or bone tumor or osteomyelitis.  There is a small benign   bone spur or osteo chondroma overlying the medial aspect of the proximal metaphysis of the distal   phalanx of the right 1st toe.     IMPRESSION:                 1. No acute disease in the right foot               MAKENNA QUINN MD         Electronically Signed and Approved By: MAKENNA QUINN MD on 6/27/2022 at 17:42             ASSESSMENT/PLAN     Diagnoses and all orders for this visit:    1. Plantar fasciitis (Primary)    2. Foot pain, right    Other orders  -     methylPREDNISolone (MEDROL) 4 MG dose pack; Take as directed  Dispense: 21 tablet; Refill: 0  -     diclofenac (VOLTAREN) 75 MG EC tablet; Take 1 tablet by mouth 2 (Two) Times a Day for 30 days.  Dispense: 60 tablet; Refill: 1      Comprehensive lower extremity examination and  evaluation was performed.    Discussed findings and treatment plan including risks, benefits, and treatment options with patient in detail. Patient agreed with treatment plan.    Treatment Options discussed:  - no treatment at all  - change in shoegear  - change in activities  - RICE therapy  - arch support  - NSAIDs  - PO steroids  - injectable steroids    Patient may begin to weight bear as tolerated in supportive shoes.  No impact activities for two weeks.  After that time, the patient may increase activities as tolerated. Patient states understanding and agreement with this plan.    An After Visit Summary was printed and given to the patient at discharge, including (if requested) any available informative/educational handouts regarding diagnosis, treatment, or medications. All questions were answered to patient/family satisfaction. Should symptoms fail to improve or worsen they agree to call or return to clinic or to go to the Emergency Department. Discussed the importance of following up with any needed screening tests/labs/specialist appointments and any requested follow-up recommended by me today. Importance of maintaining follow-up discussed and patient accepts that missed appointments can delay diagnosis and potentially lead to worsening of conditions.    Return in about 2 weeks (around 8/24/2022) for Rx f/u., or sooner if acute issues arise.    This document has been electronically signed by Darius Del Real DPM on August 10, 2022 15:49 EDT

## 2022-09-06 RX ORDER — ACYCLOVIR 400 MG/1
TABLET ORAL
Qty: 90 TABLET | Refills: 0 | Status: SHIPPED | OUTPATIENT
Start: 2022-09-06 | End: 2022-10-20 | Stop reason: SDUPTHER

## 2022-09-16 ENCOUNTER — OFFICE VISIT (OUTPATIENT)
Dept: FAMILY MEDICINE CLINIC | Facility: CLINIC | Age: 37
End: 2022-09-16

## 2022-09-16 VITALS
DIASTOLIC BLOOD PRESSURE: 88 MMHG | TEMPERATURE: 98.4 F | HEIGHT: 68 IN | SYSTOLIC BLOOD PRESSURE: 132 MMHG | WEIGHT: 208 LBS | HEART RATE: 77 BPM | BODY MASS INDEX: 31.52 KG/M2 | OXYGEN SATURATION: 97 %

## 2022-09-16 DIAGNOSIS — Z01.419 PAP SMEAR, AS PART OF ROUTINE GYNECOLOGICAL EXAMINATION: ICD-10-CM

## 2022-09-16 DIAGNOSIS — Z13.220 LIPID SCREENING: ICD-10-CM

## 2022-09-16 DIAGNOSIS — Z00.00 ANNUAL PHYSICAL EXAM: Primary | ICD-10-CM

## 2022-09-16 DIAGNOSIS — Z11.3 ROUTINE SCREENING FOR STI (SEXUALLY TRANSMITTED INFECTION): ICD-10-CM

## 2022-09-16 DIAGNOSIS — N91.2 AMENORRHEA: ICD-10-CM

## 2022-09-16 LAB
B-HCG UR QL: NEGATIVE
C TRACH RRNA CVX QL NAA+PROBE: NOT DETECTED
CANDIDA SPECIES: NEGATIVE
EXPIRATION DATE: NORMAL
GARDNERELLA VAGINALIS: POSITIVE
INTERNAL NEGATIVE CONTROL: NORMAL
INTERNAL POSITIVE CONTROL: NORMAL
Lab: NORMAL
N GONORRHOEA RRNA SPEC QL NAA+PROBE: NOT DETECTED
T VAGINALIS DNA VAG QL PROBE+SIG AMP: POSITIVE

## 2022-09-16 PROCEDURE — 2014F MENTAL STATUS ASSESS: CPT | Performed by: NURSE PRACTITIONER

## 2022-09-16 PROCEDURE — 80061 LIPID PANEL: CPT | Performed by: NURSE PRACTITIONER

## 2022-09-16 PROCEDURE — 80053 COMPREHEN METABOLIC PANEL: CPT | Performed by: NURSE PRACTITIONER

## 2022-09-16 PROCEDURE — 87660 TRICHOMONAS VAGIN DIR PROBE: CPT | Performed by: NURSE PRACTITIONER

## 2022-09-16 PROCEDURE — G0432 EIA HIV-1/HIV-2 SCREEN: HCPCS | Performed by: NURSE PRACTITIONER

## 2022-09-16 PROCEDURE — 87624 HPV HI-RISK TYP POOLED RSLT: CPT | Performed by: NURSE PRACTITIONER

## 2022-09-16 PROCEDURE — 84436 ASSAY OF TOTAL THYROXINE: CPT | Performed by: NURSE PRACTITIONER

## 2022-09-16 PROCEDURE — 84443 ASSAY THYROID STIM HORMONE: CPT | Performed by: NURSE PRACTITIONER

## 2022-09-16 PROCEDURE — G0123 SCREEN CERV/VAG THIN LAYER: HCPCS | Performed by: NURSE PRACTITIONER

## 2022-09-16 PROCEDURE — 86592 SYPHILIS TEST NON-TREP QUAL: CPT | Performed by: NURSE PRACTITIONER

## 2022-09-16 PROCEDURE — 87591 N.GONORRHOEAE DNA AMP PROB: CPT | Performed by: NURSE PRACTITIONER

## 2022-09-16 PROCEDURE — 87480 CANDIDA DNA DIR PROBE: CPT | Performed by: NURSE PRACTITIONER

## 2022-09-16 PROCEDURE — 99395 PREV VISIT EST AGE 18-39: CPT | Performed by: NURSE PRACTITIONER

## 2022-09-16 PROCEDURE — 87491 CHLMYD TRACH DNA AMP PROBE: CPT | Performed by: NURSE PRACTITIONER

## 2022-09-16 PROCEDURE — 84479 ASSAY OF THYROID (T3 OR T4): CPT | Performed by: NURSE PRACTITIONER

## 2022-09-16 PROCEDURE — 81025 URINE PREGNANCY TEST: CPT | Performed by: NURSE PRACTITIONER

## 2022-09-16 PROCEDURE — 87510 GARDNER VAG DNA DIR PROBE: CPT | Performed by: NURSE PRACTITIONER

## 2022-09-16 PROCEDURE — 3008F BODY MASS INDEX DOCD: CPT | Performed by: NURSE PRACTITIONER

## 2022-09-16 NOTE — PROGRESS NOTES
"Chief Complaint  Well Women Exam (PAP request)    Subjective          Medical History: has a past medical history of Asthma, Asthma (03/14/2017), Depression, Essential hypertension (03/14/2017), Hypertension, PCOS (polycystic ovarian syndrome), and STD (female).     Surgical History: has no past surgical history on file.     Family History: family history includes Arthritis in her father and mother; Asthma in her father and mother; Cancer in her maternal grandmother; Hypertension in her father and mother.     Social History: reports that she has never smoked. She has never used smokeless tobacco. She reports that she does not drink alcohol and does not use drugs.    Renny Cole presents to [facility]  History of Present Illness    ENCOUNTER DATE:  09/16/2022    Renny Cole / 37 y.o. / female      CHIEF COMPLAINT    Well Women Exam (PAP request)      VITALS    /88 (BP Location: Right arm, Patient Position: Sitting, Cuff Size: Adult)   Pulse 77   Temp 98.4 °F (36.9 °C) (Oral)   Ht 172.7 cm (68\")   Wt 94.3 kg (208 lb)   SpO2 97%   BMI 31.63 kg/m²     BP Readings from Last 3 Encounters:  09/16/22 : 132/88  08/10/22 : 139/88  06/27/22 : 138/84    Wt Readings from Last 3 Encounters:  09/16/22 : 94.3 kg (208 lb)  08/10/22 : 94.7 kg (208 lb 12.8 oz)  06/27/22 : 95.7 kg (211 lb)    Body mass index is 31.63 kg/m².    MEDICATIONS    Current Outpatient Medications on File Prior to Visit:  acyclovir (ZOVIRAX) 400 MG tablet, TAKE 1 TABLET BY MOUTH DAILY. NO MORE THAN 5 DOSES A DAY, Disp: 90 tablet, Rfl: 0  hydroCHLOROthiazide (HYDRODIURIL) 25 MG tablet, TAKE 1 TABLET BY MOUTH DAILY, Disp: 90 tablet, Rfl: 0  medroxyPROGESTERone (PROVERA) 10 MG tablet, , Disp: , Rfl:   methylPREDNISolone (MEDROL) 4 MG dose pack, Take as directed, Disp: 21 tablet, Rfl: 0  omeprazole (priLOSEC) 20 MG capsule, Take 1 tablet twice a day for 7 days, then take 1 tablet each day as needed for the " gastritis or heartburn., Disp: 35 capsule, Rfl: 0    No current facility-administered medications on file prior to visit.        HISTORY OF PRESENT ILLNESS    Renny presents for annual health maintenance visit.    Last health maintenance visit: approximately 1 year ago  General health: good  Lifestyle:  Attempting to lose weight?: No   Diet: has not been eating as healthy  Exercise: very active at work/home  Tobacco: Never used   Alcohol: does not drink  Work: Full-time  Reproductive health:  Sexually active?: Yes   Sexual problems?: No problems  Concern for STD?:  We will do screening today  Depression Screening:     PHQ-2: 0 (Not depressed)  PHQ-9: 0 (Negative screening for depression)    Patient Care Team:  Pippa Yeboah APRN as PCP - General (Nurse Practitioner)  Provider, No Known      ALLERGIES  No Known Allergies     PFSH:     The following portions of the patient's history were reviewed and updated as appropriate: Allergies / Current Medications / Past Medical History / Surgical History / Social History / Family History    PROBLEM LIST   There is no problem list on file for this patient.      PAST MEDICAL HISTORY  Past Medical History:  No date: Asthma  03/14/2017: Asthma  No date: Depression  03/14/2017: Essential hypertension  No date: Hypertension  No date: PCOS (polycystic ovarian syndrome)  No date: STD (female)     Comment:  GENITAL HERPES; TRICHOMONIASIS    SURGICAL HISTORY  No past surgical history on file.    SOCIAL HISTORY  Social History   Socioeconomic History     Marital status: Single   Tobacco Use     Smoking status: Never Smoker     Smokeless tobacco: Never Used   Vaping Use     Vaping Use: Never used   Substance and Sexual Activity     Alcohol use: Never     Drug use: Never     Sexual activity: Yes       Partners: Male       Birth control/protection: None      FAMILY HISTORY  Review of patient's family history indicates:  Problem: Arthritis     Relation: Mother         Age of  Onset: (Not Specified)  Problem: Asthma     Relation: Mother         Age of Onset: (Not Specified)  Problem: Hypertension     Relation: Mother         Age of Onset: (Not Specified)  Problem: Arthritis     Relation: Father         Age of Onset: (Not Specified)  Problem: Asthma     Relation: Father         Age of Onset: (Not Specified)  Problem: Hypertension     Relation: Father         Age of Onset: (Not Specified)  Problem: Cancer     Relation: Maternal Grandmother         Age of Onset: (Not Specified)         Comment: unknown  Problem: Stroke     Relation: Neg Hx         Age of Onset: (Not Specified)  Problem: Diabetes     Relation: Neg Hx         Age of Onset: (Not Specified)      IMMUNIZATION HISTORY    Immunization History  Administered            Date(s) Administered   COVID-19 (PFIZER) PURPLE CAP                         08/02/2021 08/23/2021     MMR                   08/15/1996     Td                    04/12/1999      Labs:    Lab Results      Component                Value               Date                      NA                       140                 08/17/2020                K                        3.8                 08/17/2020                CALCIUM                  9.6                 08/17/2020                AST                      27                  08/17/2020                ALT                      24                  08/17/2020                BUN                      7                   08/17/2020                CREATININE               0.70                07/06/2022                CREATININE               0.74                08/17/2020                CREATININE               0.72                12/31/2019              Lab Results      Component                Value               Date                      TSH                      1.800               08/17/2020              Lab Results      Component                Value               Date                      LDL                      45  "(L)              08/17/2020                HDL                      31 (L)              08/17/2020                TRIG                     37 (L)              08/17/2020                CHOLHDLRATIO             2.7 (L)             08/17/2020              Lab Results      Component                Value               Date                      MLYI71NC                 13.1 (L)            12/31/2019               Lab Results      Component                Value               Date                      WBC                      7.08                08/17/2020                HGB                      13.5                08/17/2020                MCV                      95.1                08/17/2020                PLT                      338                 08/17/2020              Lab Results      Component                Value               Date                      LEUKOCYTESUR             Trace (A)           12/10/2021              No results found for: HEPCVIRUSABY        ASSESSMENT & PLAN    ANNUAL WELLNESS EXAM / PHYSICAL     HEALTHCARE MAINTENANCE ISSUES    Cancer Screening:  Colon: Initial/Next screening at age: 44  Repeat colon cancer screening: N/A at this time  Breast: Recommended monthly self exams; annual professional exam  Mammogram: N/A at this time  Cervical: 1 year  Skin: Monthly self skin examination, annual exam by health professional      Lifestyle Counseling:  Lifestyle Modifications: Improve dietary compliance  Safety Issues: Always wear seatbelt, Avoid texting while driving   Use sunscreen, regular skin examination  Recommended annual dental/vision examination.  Emotional/Stress/Sleep: Reviewed and  given when appropriate  Gynecologic Exam        Objective   Vital Signs:   /88 (BP Location: Right arm, Patient Position: Sitting, Cuff Size: Adult)   Pulse 77   Temp 98.4 °F (36.9 °C) (Oral)   Ht 172.7 cm (68\")   Wt 94.3 kg (208 lb)   SpO2 97%   BMI 31.63 kg/m²     Physical Exam  Vitals " reviewed. Exam conducted with a chaperone present (Viv Petit MA).   Constitutional:       Appearance: Normal appearance. She is well-developed.   HENT:      Head: Normocephalic and atraumatic.      Right Ear: External ear normal.      Left Ear: External ear normal.   Eyes:      Conjunctiva/sclera: Conjunctivae normal.      Pupils: Pupils are equal, round, and reactive to light.   Cardiovascular:      Rate and Rhythm: Normal rate and regular rhythm.      Heart sounds: No murmur heard.  Pulmonary:      Effort: Pulmonary effort is normal.      Breath sounds: Normal breath sounds. No wheezing or rhonchi.   Chest:   Breasts:      Right: Normal. No mass, nipple discharge or tenderness.      Left: Normal. No mass, nipple discharge or tenderness.       Genitourinary:     General: Normal vulva.      Exam position: Knee-chest position.      Vagina: Normal.      Cervix: Normal.      Uterus: Normal.    Skin:     General: Skin is warm and dry.   Neurological:      Mental Status: She is alert and oriented to person, place, and time.   Psychiatric:         Mood and Affect: Mood and affect normal.         Behavior: Behavior normal.         Thought Content: Thought content normal.         Judgment: Judgment normal.        Result Review :   The following data was reviewed by: LOYDA Stephens on 09/16/2022:  Common labs    Common Labsle 7/6/22   Creatinine 0.70      Comments are available for some flowsheets but are not being displayed.           Data reviewed: Previous Pap and labs            Current Outpatient Medications on File Prior to Visit   Medication Sig Dispense Refill   • acyclovir (ZOVIRAX) 400 MG tablet TAKE 1 TABLET BY MOUTH DAILY. NO MORE THAN 5 DOSES A DAY 90 tablet 0   • hydroCHLOROthiazide (HYDRODIURIL) 25 MG tablet TAKE 1 TABLET BY MOUTH DAILY 90 tablet 0   • medroxyPROGESTERone (PROVERA) 10 MG tablet      • methylPREDNISolone (MEDROL) 4 MG dose pack Take as directed 21 tablet 0   • omeprazole  (priLOSEC) 20 MG capsule Take 1 tablet twice a day for 7 days, then take 1 tablet each day as needed for the gastritis or heartburn. 35 capsule 0     No current facility-administered medications on file prior to visit.        Assessment and Plan    Diagnoses and all orders for this visit:    1. Annual physical exam (Primary)  -     Thyroid Panel With TSH  -     CBC & Differential  -     Comprehensive Metabolic Panel    2. Pap smear, as part of routine gynecological examination  -     IgP, Aptima HPV    3. Lipid screening  Comments:  We will do a screening for hyperlipidemia  Orders:  -     Lipid panel    4. Routine screening for STI (sexually transmitted infection)  -     Chlamydia trachomatis, Neisseria gonorrhoeae, PCR - , Urine, Clean Catch  -     Gardnerella vaginalis, Trichomonas vaginalis, Candida albicans, DNA - Swab, Vagina  -     RPR  -     HIV-1/O/2 Ag/Ab w Reflex    5. Amenorrhea  Comments:  X 2 months patient did recently start in vitro medication from her fertility doctor in July, likely the cause of the amenorrhea.  We will continue to monitor  Orders:  -     Thyroid Panel With TSH  -     POCT pregnancy, urine        Follow Up   Return in about 1 year (around 9/16/2023) for Annual physical.  Patient was given instructions and counseling regarding her condition or for health maintenance advice. Please see specific information pulled into the AVS if appropriate.

## 2022-09-17 LAB
ALBUMIN SERPL-MCNC: 4.7 G/DL (ref 3.5–5.2)
ALBUMIN/GLOB SERPL: 1.4 G/DL
ALP SERPL-CCNC: 61 U/L (ref 39–117)
ALT SERPL W P-5'-P-CCNC: 18 U/L (ref 1–33)
ANION GAP SERPL CALCULATED.3IONS-SCNC: 13.2 MMOL/L (ref 5–15)
AST SERPL-CCNC: 23 U/L (ref 1–32)
BILIRUB SERPL-MCNC: 0.5 MG/DL (ref 0–1.2)
BUN SERPL-MCNC: 9 MG/DL (ref 6–20)
BUN/CREAT SERPL: 12 (ref 7–25)
CALCIUM SPEC-SCNC: 9.5 MG/DL (ref 8.6–10.5)
CHLORIDE SERPL-SCNC: 105 MMOL/L (ref 98–107)
CHOLEST SERPL-MCNC: 89 MG/DL (ref 0–200)
CO2 SERPL-SCNC: 22.8 MMOL/L (ref 22–29)
CREAT SERPL-MCNC: 0.75 MG/DL (ref 0.57–1)
EGFRCR SERPLBLD CKD-EPI 2021: 104.7 ML/MIN/1.73
GLOBULIN UR ELPH-MCNC: 3.3 GM/DL
GLUCOSE SERPL-MCNC: 120 MG/DL (ref 65–99)
HDLC SERPL-MCNC: 37 MG/DL (ref 40–60)
HIV1+2 AB SER QL: NORMAL
LDLC SERPL CALC-MCNC: 36 MG/DL (ref 0–100)
LDLC/HDLC SERPL: 0.99 {RATIO}
POTASSIUM SERPL-SCNC: 3.7 MMOL/L (ref 3.5–5.2)
PROT SERPL-MCNC: 8 G/DL (ref 6–8.5)
RPR SER QL: NORMAL
SODIUM SERPL-SCNC: 141 MMOL/L (ref 136–145)
T-UPTAKE NFR SERPL: 1.01 TBI (ref 0.8–1.3)
T4 SERPL-MCNC: 8.88 MCG/DL (ref 4.5–11.7)
TRIGL SERPL-MCNC: 76 MG/DL (ref 0–150)
TSH SERPL DL<=0.05 MIU/L-ACNC: 0.98 UIU/ML (ref 0.27–4.2)
VLDLC SERPL-MCNC: 16 MG/DL (ref 5–40)

## 2022-09-19 ENCOUNTER — TELEPHONE (OUTPATIENT)
Dept: FAMILY MEDICINE CLINIC | Facility: CLINIC | Age: 37
End: 2022-09-19

## 2022-09-19 RX ORDER — METRONIDAZOLE 500 MG/1
500 TABLET ORAL 2 TIMES DAILY
Qty: 20 TABLET | Refills: 0 | Status: SHIPPED | OUTPATIENT
Start: 2022-09-19 | End: 2022-09-22 | Stop reason: SDUPTHER

## 2022-09-19 NOTE — TELEPHONE ENCOUNTER
Caller: Renny Cole    Relationship: Self    Best call back number: [phone]    What is the best time to reach you: AFTER 3:30PM    Who are you requesting to speak with (clinical staff, provider,  specific staff member): CLINICAL      What was the call regarding:LAB RESULTS FROM 09/16    Do you require a callback: YES

## 2022-09-19 NOTE — TELEPHONE ENCOUNTER
Caller: Renny Cole    Relationship: Self    Best call back number: [phone]    What is the best time to reach you: 11 AM OR 3 PM     Who are you requesting to speak with CLINICAL     What was the call regarding: PATIENT WOULD LIKE A CALL BACK REGARDING LAB WORK

## 2022-09-20 ENCOUNTER — PATIENT MESSAGE (OUTPATIENT)
Dept: FAMILY MEDICINE CLINIC | Facility: CLINIC | Age: 37
End: 2022-09-20

## 2022-09-20 NOTE — TELEPHONE ENCOUNTER
Patient has been notified of all lab results via phone call and has been informed of the directions for her medication and why it is so important she not mix with alcohol and take as directed.

## 2022-09-22 ENCOUNTER — TELEPHONE (OUTPATIENT)
Dept: FAMILY MEDICINE CLINIC | Facility: CLINIC | Age: 37
End: 2022-09-22

## 2022-09-22 RX ORDER — METRONIDAZOLE 500 MG/1
500 TABLET ORAL 2 TIMES DAILY
Qty: 20 TABLET | Refills: 0 | Status: SHIPPED | OUTPATIENT
Start: 2022-09-22 | End: 2022-10-02
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
## 2022-10-03 RX ORDER — DICLOFENAC SODIUM 75 MG/1
TABLET, DELAYED RELEASE ORAL
Qty: 60 TABLET | Refills: 1 | Status: SHIPPED | OUTPATIENT
Start: 2022-10-03 | End: 2022-11-28

## 2022-10-21 RX ORDER — ACYCLOVIR 400 MG/1
400 TABLET ORAL 3 TIMES DAILY PRN
Qty: 90 TABLET | Refills: 1 | Status: SHIPPED | OUTPATIENT
Start: 2022-10-21 | End: 2022-12-06

## 2022-11-14 ENCOUNTER — HOSPITAL ENCOUNTER (EMERGENCY)
Facility: HOSPITAL | Age: 37
Discharge: HOME | End: 2022-11-14
Attending: EMERGENCY MEDICINE | Admitting: EMERGENCY MEDICINE

## 2022-11-14 VITALS
BODY MASS INDEX: 29.58 KG/M2 | WEIGHT: 195.2 LBS | RESPIRATION RATE: 16 BRPM | TEMPERATURE: 98.5 F | OXYGEN SATURATION: 100 % | HEART RATE: 83 BPM | HEIGHT: 68 IN | DIASTOLIC BLOOD PRESSURE: 103 MMHG | SYSTOLIC BLOOD PRESSURE: 155 MMHG

## 2022-11-14 DIAGNOSIS — M54.31 RIGHT SIDED SCIATICA: ICD-10-CM

## 2022-11-14 DIAGNOSIS — S39.012A LUMBAR STRAIN, INITIAL ENCOUNTER: Primary | ICD-10-CM

## 2022-11-14 PROCEDURE — 96372 THER/PROPH/DIAG INJ SC/IM: CPT

## 2022-11-14 PROCEDURE — 25010000002 KETOROLAC TROMETHAMINE PER 15 MG: Performed by: NURSE PRACTITIONER

## 2022-11-14 PROCEDURE — 25010000002 DEXAMETHASONE PER 1 MG: Performed by: NURSE PRACTITIONER

## 2022-11-14 PROCEDURE — 99283 EMERGENCY DEPT VISIT LOW MDM: CPT

## 2022-11-14 RX ORDER — KETOROLAC TROMETHAMINE 30 MG/ML
60 INJECTION, SOLUTION INTRAMUSCULAR; INTRAVENOUS ONCE
Status: COMPLETED | OUTPATIENT
Start: 2022-11-14 | End: 2022-11-14

## 2022-11-14 RX ORDER — NAPROXEN 500 MG/1
500 TABLET ORAL 2 TIMES DAILY PRN
Qty: 20 TABLET | Refills: 0 | Status: SHIPPED | OUTPATIENT
Start: 2022-11-14 | End: 2022-12-12

## 2022-11-14 RX ORDER — METHYLPREDNISOLONE 4 MG/1
TABLET ORAL
Qty: 21 TABLET | Refills: 0 | Status: SHIPPED | OUTPATIENT
Start: 2022-11-14 | End: 2022-11-28

## 2022-11-14 RX ORDER — DEXAMETHASONE SODIUM PHOSPHATE 10 MG/ML
10 INJECTION INTRAMUSCULAR; INTRAVENOUS ONCE
Status: COMPLETED | OUTPATIENT
Start: 2022-11-14 | End: 2022-11-14

## 2022-11-14 RX ADMIN — DEXAMETHASONE SODIUM PHOSPHATE 10 MG: 10 INJECTION INTRAMUSCULAR; INTRAVENOUS at 11:41

## 2022-11-14 RX ADMIN — KETOROLAC TROMETHAMINE 60 MG: 60 INJECTION, SOLUTION INTRAMUSCULAR at 11:40

## 2022-11-14 NOTE — ED PROVIDER NOTES
Subjective   History of Present Illness  Renny is a 37-year-old female that presents to the emergency department today for complaints of lower back pain that radiates down her right leg since yesterday when she was getting out of a car and twisted wrong.  She rates her pain today a 10 out of 10 and worse with walking, putting pressure on her right leg, bending.  She denies any fecal or urinary incontinence.  She denies any previous back injuries.        Review of Systems   Musculoskeletal: Positive for back pain and myalgias.   All other systems reviewed and are negative.      Past Medical History:   Diagnosis Date   • Asthma    • Asthma 03/14/2017   • Depression    • Essential hypertension 03/14/2017   • Hypertension    • PCOS (polycystic ovarian syndrome)    • STD (female)     GENITAL HERPES; TRICHOMONIASIS       No Known Allergies    History reviewed. No pertinent surgical history.    Family History   Problem Relation Age of Onset   • Arthritis Mother    • Asthma Mother    • Hypertension Mother    • Arthritis Father    • Asthma Father    • Hypertension Father    • Cancer Maternal Grandmother         unknown   • Stroke Neg Hx    • Diabetes Neg Hx        Social History     Socioeconomic History   • Marital status: Single   Tobacco Use   • Smoking status: Never   • Smokeless tobacco: Never   Vaping Use   • Vaping Use: Never used   Substance and Sexual Activity   • Alcohol use: Never   • Drug use: Never   • Sexual activity: Yes     Partners: Male     Birth control/protection: None           Objective   Physical Exam  Vitals and nursing note reviewed.   Constitutional:       General: She is not in acute distress.     Appearance: Normal appearance. She is not ill-appearing, toxic-appearing or diaphoretic.   HENT:      Head: Normocephalic and atraumatic.      Nose: Nose normal.      Mouth/Throat:      Mouth: Mucous membranes are moist.   Eyes:      Conjunctiva/sclera: Conjunctivae normal.   Cardiovascular:      Rate  and Rhythm: Normal rate.      Pulses: Normal pulses.   Pulmonary:      Effort: Pulmonary effort is normal.   Abdominal:      General: Abdomen is flat.   Musculoskeletal:         General: Tenderness present. No swelling, deformity or signs of injury.      Cervical back: Neck supple.      Right lower leg: No edema.      Left lower leg: No edema.      Comments: Decreased range of motion to lumbar spine.  Positive straight leg raise at 20 degrees of right leg.  Lumbar spinal and right paraspinal tenderness on palpation.  No spasm noted.   Skin:     General: Skin is warm and dry.      Capillary Refill: Capillary refill takes less than 2 seconds.   Neurological:      Mental Status: She is alert. Mental status is at baseline.   Psychiatric:         Mood and Affect: Mood normal.         Behavior: Behavior normal.         Thought Content: Thought content normal.         Judgment: Judgment normal.         Procedures           ED Course                                           MDM  Number of Diagnoses or Management Options  Lumbar strain, initial encounter  Right sided sciatica  Diagnosis management comments: Seen and assessed patient as noted.  Vitals stable, no acute distress, afebrile.       Differentials include but are not limited to: Lumbar strain, sciatica.    Patient is neurologically intact with sensation intact and no focal deficits noted.  I feel the patient has a lumbar strain with right-sided sciatica.  Educated patient on worrisome symptoms to return for and she verbalized understanding.  No imaging was ordered today because she denies any injury.      Risk of Complications, Morbidity, and/or Mortality  Presenting problems: low  Diagnostic procedures: low  Management options: low    Patient Progress  Patient progress: stable      Final diagnoses:   Lumbar strain, initial encounter   Right sided sciatica       ED Disposition  ED Disposition     ED Disposition   Discharge    Condition   Stable    Comment   --              Pippa Yeboah, APRN  [address]  [address]  [facility] [address]  [phone]      As needed         Medication List      New Prescriptions    naproxen 500 MG EC tablet  Commonly known as: EC NAPROSYN  Take 1 tablet by mouth 2 (Two) Times a Day As Needed for Mild Pain.        Changed    * methylPREDNISolone 4 MG dose pack  Commonly known as: MEDROL  Take as directed  What changed: Another medication with the same name was added. Make sure you understand how and when to take each.     * methylPREDNISolone 4 MG dose pack  Commonly known as: MEDROL  Take as directed on package instructions.  What changed: You were already taking a medication with the same name, and this prescription was added. Make sure you understand how and when to take each.         * This list has 2 medication(s) that are the same as other medications prescribed for you. Read the directions carefully, and ask your doctor or other care provider to review them with you.               Where to Get Your Medications      These medications were sent to [id number] - [place] - [address] AT [facility] OF [address] [facility] & [place] - [phone] [facility] [phone]   [address] [place] [address]    Phone: [phone]   · methylPREDNISolone 4 MG dose pack  · naproxen 500 MG EC tablet          Jazz Camacho, APRN  11/14/22 2742

## 2022-11-14 NOTE — DISCHARGE INSTRUCTIONS
Take your steroids as prescribed, take your pain medication as needed with food.  Ice your back 30 minutes on and 30 minutes off.  No lifting, bending, twisting, overhead work, prolonged standing, prolonged sitting for 1 week.  Follow-up with your PCP as needed for continued back pain.

## 2022-11-28 ENCOUNTER — OFFICE VISIT (OUTPATIENT)
Dept: FAMILY MEDICINE CLINIC | Facility: CLINIC | Age: 37
End: 2022-11-28

## 2022-11-28 ENCOUNTER — TELEPHONE (OUTPATIENT)
Dept: FAMILY MEDICINE CLINIC | Facility: CLINIC | Age: 37
End: 2022-11-28

## 2022-11-28 VITALS
TEMPERATURE: 97.1 F | BODY MASS INDEX: 32.13 KG/M2 | HEIGHT: 68 IN | HEART RATE: 103 BPM | OXYGEN SATURATION: 97 % | SYSTOLIC BLOOD PRESSURE: 148 MMHG | DIASTOLIC BLOOD PRESSURE: 100 MMHG | WEIGHT: 212 LBS

## 2022-11-28 DIAGNOSIS — Z02.89 ENCOUNTER FOR COMPLETION OF FORM WITH PATIENT: ICD-10-CM

## 2022-11-28 DIAGNOSIS — M54.41 ACUTE MIDLINE LOW BACK PAIN WITH RIGHT-SIDED SCIATICA: Primary | ICD-10-CM

## 2022-11-28 PROCEDURE — 99214 OFFICE O/P EST MOD 30 MIN: CPT | Performed by: NURSE PRACTITIONER

## 2022-11-28 RX ORDER — TRAMADOL HYDROCHLORIDE 50 MG/1
50 TABLET ORAL EVERY 6 HOURS PRN
Qty: 60 TABLET | Refills: 0 | Status: SHIPPED | OUTPATIENT
Start: 2022-11-28

## 2022-11-28 RX ORDER — METHOCARBAMOL 750 MG/1
750 TABLET, FILM COATED ORAL 4 TIMES DAILY PRN
Qty: 80 TABLET | Refills: 0 | Status: SHIPPED | OUTPATIENT
Start: 2022-11-28 | End: 2022-12-18

## 2022-11-28 RX ORDER — NABUMETONE 750 MG/1
750 TABLET, FILM COATED ORAL 2 TIMES DAILY
Qty: 60 TABLET | Refills: 2 | Status: SHIPPED | OUTPATIENT
Start: 2022-11-28

## 2022-11-28 NOTE — PROGRESS NOTES
"Chief Complaint  Back Pain    Subjective        Medical History: has a past medical history of Asthma, Asthma (03/14/2017), Depression, Essential hypertension (03/14/2017), Hypertension, PCOS (polycystic ovarian syndrome), and STD (female).     Surgical History: has no past surgical history on file.     Family History: family history includes Arthritis in her father and mother; Asthma in her father and mother; Cancer in her maternal grandmother; Hypertension in her father and mother.     Social History: reports that she has never smoked. She has never used smokeless tobacco. She reports that she does not drink alcohol and does not use drugs.    Renny Cole presents to [facility]  History of Present Illness  Patient comes in for ER follow-up.  Patient is complaining of low back pain with pain radiating down her right leg.  She was seen in the emergency department 11/14/2022.  She was given a Toradol injection and a dexamethasone injection.  Patient states that at that time she was getting out of the car and twisted wrong.  She she denies any bowel or bladder incontinence, no imaging was done in the ER.  Back Pain  This is a new problem. The current episode started 1 to 4 weeks ago. The problem occurs constantly. The problem has been gradually worsening since onset. The pain is present in the gluteal. The quality of the pain is described as shooting. The pain radiates to the right thigh and right knee. The pain is at a severity of 9/10. The pain is severe. The pain is worse during the day. Exacerbated by: walking, sitting. Stiffness is present in the morning. Pertinent negatives include no bladder incontinence, bowel incontinence or paresthesias. Risk factors include obesity. She has tried NSAIDs (steriod pack) for the symptoms. The treatment provided no relief.       Objective   Vital Signs:   /100   Pulse 103   Temp 97.1 °F (36.2 °C)   Ht 172.7 cm (68\")   Wt 96.2 kg " (212 lb)   SpO2 97%   BMI 32.23 kg/m²       Wt Readings from Last 3 Encounters:   11/28/22 96.2 kg (212 lb)   11/14/22 88.5 kg (195 lb 3.2 oz)   09/16/22 94.3 kg (208 lb)        BP Readings from Last 3 Encounters:   11/28/22 148/100   11/14/22 (!) 155/103   09/16/22 132/88        BMI is >= 30 and <35. (Class 1 Obesity). The following options were offered after discussion;: weight loss educational material (shared in after visit summary)       Physical Exam  Vitals reviewed.   Constitutional:       Appearance: Normal appearance. She is well-developed.   HENT:      Head: Normocephalic and atraumatic.      Right Ear: External ear normal.      Left Ear: External ear normal.   Eyes:      Conjunctiva/sclera: Conjunctivae normal.      Pupils: Pupils are equal, round, and reactive to light.   Cardiovascular:      Rate and Rhythm: Normal rate and regular rhythm.      Heart sounds: No murmur heard.  Pulmonary:      Effort: Pulmonary effort is normal.      Breath sounds: Normal breath sounds. No wheezing or rhonchi.   Musculoskeletal:      Lumbar back: Spasms, tenderness and bony tenderness present. Decreased range of motion. Positive right straight leg raise test. Negative left straight leg raise test.   Skin:     General: Skin is warm and dry.   Neurological:      Mental Status: She is alert and oriented to person, place, and time.   Psychiatric:         Mood and Affect: Mood and affect normal.         Behavior: Behavior normal.         Thought Content: Thought content normal.         Judgment: Judgment normal.        Result Review :  {The following data was reviewed by LOYDA Stephens on 11/28/2022.  Common labs    Common Labs 7/6/22 9/16/22 9/16/22     1101 1101   Glucose  120 (A)    BUN  9    Creatinine 0.70 0.75    Sodium  141    Potassium  3.7    Chloride  105    Calcium  9.5    Albumin  4.70    Total Bilirubin  0.5    Alkaline Phosphatase  61    AST (SGOT)  23    ALT (SGPT)  18    Total Cholesterol   89    Triglycerides   76   HDL Cholesterol   37 (A)   LDL Cholesterol    36   (A) Abnormal value       Comments are available for some flowsheets but are not being displayed.           Data reviewed: ER visit 11/14/2022            Current Outpatient Medications on File Prior to Visit   Medication Sig Dispense Refill   • acyclovir (ZOVIRAX) 400 MG tablet Take 1 tablet by mouth 3 (Three) Times a Day As Needed (outbreak). Take no more than 5 doses a day. 90 tablet 1   • hydroCHLOROthiazide (HYDRODIURIL) 25 MG tablet TAKE 1 TABLET BY MOUTH DAILY 90 tablet 0   • metFORMIN (GLUCOPHAGE) 500 MG tablet      • naproxen (EC NAPROSYN) 500 MG EC tablet Take 1 tablet by mouth 2 (Two) Times a Day As Needed for Mild Pain. 20 tablet 0   • [DISCONTINUED] diclofenac (VOLTAREN) 75 MG EC tablet TAKE 1 TABLET BY MOUTH TWICE DAILY 60 tablet 1   • [DISCONTINUED] medroxyPROGESTERone (PROVERA) 10 MG tablet      • [DISCONTINUED] methylPREDNISolone (MEDROL) 4 MG dose pack Take as directed 21 tablet 0   • [DISCONTINUED] methylPREDNISolone (MEDROL) 4 MG dose pack Take as directed on package instructions. 21 tablet 0   • [DISCONTINUED] omeprazole (priLOSEC) 20 MG capsule Take 1 tablet twice a day for 7 days, then take 1 tablet each day as needed for the gastritis or heartburn. 35 capsule 0     No current facility-administered medications on file prior to visit.        Assessment and Plan  Diagnoses and all orders for this visit:    1. Acute midline low back pain with right-sided sciatica (Primary)  Comments:  Will start on Albutein, Robaxin, physical therapy and tramadol for severe pain.  Patient is agreeable treatment plan.  Aman appropriate low risk for drug abus  Orders:  -     XR Spine Lumbar 4+ View; Future  -     Ambulatory Referral to Physical Therapy Evaluate and treat  -     traMADol (ULTRAM) 50 MG tablet; Take 1 tablet by mouth Every 6 (Six) Hours As Needed for Moderate Pain or Severe Pain.  Dispense: 60 tablet; Refill: 0    2. Encounter  for completion of form with patient  Comments:  FMLA paperwork has been filled out until 12/19 patient to follow-up on 12/12 to see how she is doing.    Other orders  -     nabumetone (RELAFEN) 750 MG tablet; Take 1 tablet by mouth 2 (Two) Times a Day.  Dispense: 60 tablet; Refill: 2  -     methocarbamol (ROBAXIN) 750 MG tablet; Take 1 tablet by mouth 4 (Four) Times a Day As Needed for Muscle Spasms for up to 20 days.  Dispense: 80 tablet; Refill: 0    Risk and benefits of opioid medication such as tolerance, dependence, addiction, misuse, abuse, diversion, opioid use disorder, accidental overdose resulting in death, drug interactions, and other systemic side effects were discussed with the patient.        Follow Up   Return in about 2 weeks (around 12/12/2022).  Patient was given instructions and counseling regarding her condition or for health maintenance advice. Please see specific information pulled into the AVS if appropriate.       Part of this note may be electronic transcription/translation of spoken language to printed text using the Dragon dictation system

## 2022-11-28 NOTE — TELEPHONE ENCOUNTER
PHYSICIAN ORDERS - SCAN - MARY /SRAVAN/ 11/28/2022 (11/28/2022)      Successful eFax transmittal.  Per patient request faxed to [employer]  FAX: [phone]

## 2022-12-01 ENCOUNTER — TELEPHONE (OUTPATIENT)
Dept: FAMILY MEDICINE CLINIC | Facility: CLINIC | Age: 37
End: 2022-12-01

## 2022-12-01 NOTE — TELEPHONE ENCOUNTER
Patient had employment paperwork filled out on 11/28/2022 at her appointment. However, it was filled out incorrectly. She is bringing in new paperwork for the provider to fill out on 12/2/2022.

## 2022-12-06 RX ORDER — ACYCLOVIR 400 MG/1
TABLET ORAL
Qty: 90 TABLET | Refills: 1 | Status: SHIPPED | OUTPATIENT
Start: 2022-12-06

## 2022-12-12 ENCOUNTER — OFFICE VISIT (OUTPATIENT)
Dept: FAMILY MEDICINE CLINIC | Facility: CLINIC | Age: 37
End: 2022-12-12

## 2022-12-12 VITALS
DIASTOLIC BLOOD PRESSURE: 98 MMHG | BODY MASS INDEX: 32.89 KG/M2 | HEART RATE: 86 BPM | HEIGHT: 68 IN | WEIGHT: 217 LBS | TEMPERATURE: 96.3 F | SYSTOLIC BLOOD PRESSURE: 158 MMHG | OXYGEN SATURATION: 97 %

## 2022-12-12 DIAGNOSIS — M54.41 ACUTE MIDLINE LOW BACK PAIN WITH RIGHT-SIDED SCIATICA: ICD-10-CM

## 2022-12-12 DIAGNOSIS — M70.71 BURSITIS OF OTHER BURSA OF RIGHT HIP: ICD-10-CM

## 2022-12-12 DIAGNOSIS — Z09 FOLLOW-UP EXAM: Primary | ICD-10-CM

## 2022-12-12 PROCEDURE — 99213 OFFICE O/P EST LOW 20 MIN: CPT | Performed by: NURSE PRACTITIONER

## 2022-12-12 NOTE — PROGRESS NOTES
Chief Complaint  Hip Pain    Subjective        Medical History: has a past medical history of Asthma, Asthma (03/14/2017), Depression, Essential hypertension (03/14/2017), Hypertension, PCOS (polycystic ovarian syndrome), and STD (female).     Surgical History: has no past surgical history on file.     Family History: family history includes Arthritis in her father and mother; Asthma in her father and mother; Cancer in her maternal grandmother; Hypertension in her father and mother.     Social History: reports that she has never smoked. She has never used smokeless tobacco. She reports that she does not drink alcohol and does not use drugs.    Renny Cole presents to [facility]  History of Present Illness  Patient comes in for ER follow-up.  Patient is complaining of low back pain with pain radiating down her right leg.  She was seen in the emergency department 11/14/2022.  She was given a Toradol injection and a dexamethasone injection.  Patient states that at that time she was getting out of the car and twisted wrong.  She she denies any bowel or bladder incontinence, no imaging was done in the ER.  Back Pain  This is a new problem. The current episode started 1 to 4 weeks ago. The problem occurs constantly. The problem has been gradually worsening since onset. The pain is present in the gluteal. The quality of the pain is described as shooting. The pain radiates to the right thigh and right knee. The pain is at a severity of 9/10. The pain is severe. The pain is worse during the day. Exacerbated by: walking, sitting. Stiffness is present in the morning. Pertinent negatives include no bladder incontinence, bowel incontinence or paresthesias. Risk factors include obesity. She has tried NSAIDs (steriod pack) for the symptoms. The treatment provided no relief.     Patient was given anti-inflammatory, muscle relaxer, and tramadol when she see me on 11/28/2022.  She comes in for  "2-week follow-up.  States the back pain has improved but pain is now localized to the right hip.  She is tender over the bursa, due to the tenderness its nonspecific for exactly the bursitis would be located.  Objective   Vital Signs:   /98   Pulse 86   Temp 96.3 °F (35.7 °C)   Ht 172.7 cm (68\")   Wt 98.4 kg (217 lb)   SpO2 97%   BMI 32.99 kg/m²       Wt Readings from Last 3 Encounters:   12/12/22 98.4 kg (217 lb)   11/28/22 96.2 kg (212 lb)   11/14/22 88.5 kg (195 lb 3.2 oz)        BP Readings from Last 3 Encounters:   12/12/22 158/98   11/28/22 148/100   11/14/22 (!) 155/103        BMI is >= 30 and <35. (Class 1 Obesity). The following options were offered after discussion;: weight loss educational material (shared in after visit summary)       Physical Exam  Vitals reviewed.   Constitutional:       Appearance: Normal appearance. She is well-developed.   HENT:      Head: Normocephalic and atraumatic.   Eyes:      Conjunctiva/sclera: Conjunctivae normal.      Pupils: Pupils are equal, round, and reactive to light.   Cardiovascular:      Rate and Rhythm: Normal rate and regular rhythm.      Heart sounds: No murmur heard.  Pulmonary:      Effort: Pulmonary effort is normal.      Breath sounds: Normal breath sounds. No wheezing or rhonchi.   Musculoskeletal:      Right hip: Tenderness and bony tenderness present.   Skin:     General: Skin is warm and dry.   Neurological:      Mental Status: She is alert and oriented to person, place, and time.   Psychiatric:         Mood and Affect: Mood and affect normal.         Behavior: Behavior normal.         Thought Content: Thought content normal.         Judgment: Judgment normal.        Result Review :  {The following data was reviewed by LOYDA Stephens on 12/12/2022.  Common labs    Common Labs 7/6/22 9/16/22 9/16/22     1101 1101   Glucose  120 (A)    BUN  9    Creatinine 0.70 0.75    Sodium  141    Potassium  3.7    Chloride  105    Calcium  9.5  "   Albumin  4.70    Total Bilirubin  0.5    Alkaline Phosphatase  61    AST (SGOT)  23    ALT (SGPT)  18    Total Cholesterol   89   Triglycerides   76   HDL Cholesterol   37 (A)   LDL Cholesterol    36   (A) Abnormal value       Comments are available for some flowsheets but are not being displayed.           Data reviewed: Previous office note            Current Outpatient Medications on File Prior to Visit   Medication Sig Dispense Refill   • acyclovir (ZOVIRAX) 400 MG tablet TAKE 1 TABLET BY MOUTH DAILY. NO MORE THAN 5 DOSES A DAY 90 tablet 1   • hydroCHLOROthiazide (HYDRODIURIL) 25 MG tablet TAKE 1 TABLET BY MOUTH DAILY 90 tablet 0   • methocarbamol (ROBAXIN) 750 MG tablet Take 1 tablet by mouth 4 (Four) Times a Day As Needed for Muscle Spasms for up to 20 days. 80 tablet 0   • nabumetone (RELAFEN) 750 MG tablet Take 1 tablet by mouth 2 (Two) Times a Day. 60 tablet 2   • traMADol (ULTRAM) 50 MG tablet Take 1 tablet by mouth Every 6 (Six) Hours As Needed for Moderate Pain or Severe Pain. 60 tablet 0   • metFORMIN (GLUCOPHAGE) 500 MG tablet      • [DISCONTINUED] naproxen (EC NAPROSYN) 500 MG EC tablet Take 1 tablet by mouth 2 (Two) Times a Day As Needed for Mild Pain. 20 tablet 0     No current facility-administered medications on file prior to visit.        Assessment and Plan  Diagnoses and all orders for this visit:    1. Follow-up exam (Primary)    2. Acute midline low back pain with right-sided sciatica  Comments:  Back pain has improved greatly, she is feeling better.    3. Bursitis of other bursa of right hip  Comments:  Tender over bursa, discussed steroid injections in the bursa patient would like to hold discussed KT taping, patient states she has KT tape at home she will tape her hip tonight.  Discussed with patient to look at a YouTube video to do the hip taping.  Discussed if no improvement to make an appointment we can do the steroid injections into the bursa.  Patient verbalized understanding  agreeable treatment plan.        Follow Up   Return for If symptoms do not improve new concerning symptoms.  Patient was given instructions and counseling regarding her condition or for health maintenance advice. Please see specific information pulled into the AVS if appropriate.       Part of this note may be electronic transcription/translation of spoken language to printed text using the Dragon dictation system

## 2022-12-22 ENCOUNTER — TELEPHONE (OUTPATIENT)
Dept: FAMILY MEDICINE CLINIC | Facility: CLINIC | Age: 37
End: 2022-12-22

## 2022-12-22 NOTE — TELEPHONE ENCOUNTER
HUB TO READ  DUE TO WEATHER CONDITIONS ON 12/23, PATIENT REQUESTED TO DO HER 12/23 VISIT AS A VIDEO VISIT. PLEASE ADVISE PATIENT.

## 2022-12-23 ENCOUNTER — TELEMEDICINE (OUTPATIENT)
Dept: FAMILY MEDICINE CLINIC | Facility: CLINIC | Age: 37
End: 2022-12-23

## 2022-12-23 DIAGNOSIS — M54.41 ACUTE MIDLINE LOW BACK PAIN WITH RIGHT-SIDED SCIATICA: Primary | ICD-10-CM

## 2022-12-23 DIAGNOSIS — M25.551 RIGHT HIP PAIN: ICD-10-CM

## 2022-12-23 PROCEDURE — 99214 OFFICE O/P EST MOD 30 MIN: CPT | Performed by: NURSE PRACTITIONER

## 2022-12-27 ENCOUNTER — TELEPHONE (OUTPATIENT)
Dept: FAMILY MEDICINE CLINIC | Facility: CLINIC | Age: 37
End: 2022-12-27

## 2022-12-27 NOTE — PROGRESS NOTES
Answers for HPI/ROS submitted by the patient on 12/22/2022  What is the primary reason for your visit?: Back Pain  Chronicity: recurrent  Onset: more than 1 month ago  Frequency: every several days  Progression since onset: waxing and waning  Pain location: lumbar spine  Pain quality: cramping  Radiates to: right knee, right thigh  Pain - numeric: 9/10  Pain is: the same all the time  Aggravated by: standing  Stiffness is present: all day  abdominal pain: No  bladder incontinence: No  chest pain: No  dysuria: No  headaches: No  leg pain: Yes  paresis: No  paresthesias: No  perianal numbness: No  tingling: Yes  weight loss: No  Risk factors: lack of exercise    Chief Complaint  Back Pain and Hip Pain    Subjective         Renny Patelclaudiolydia Cole presents to [facility]  History of Present Illness  Patient has been having ongoing issue with low back pain and hip pain.  Patient had to take FMLA 2 weeks off work in November, felt like she was getting better back pain had improved with NSAIDs, rest and muscle relaxers, she went back to work full duty and pain got significantly worse.  Patient states she had to leave the job because of how much pain she was in.  It was recommended that she take another 2 weeks off work.  Patient is employer is recommended December 20 to January 15 off work following physical therapy.  Patient is very tender along the right greater trochanter hip bursa.  We discussed for her to make an appointment to do bursa injections to the right hip to help with the pain.  Patient is also needing updated FMLA paperwork filled out.  Back Pain  This is a recurrent problem. The current episode started more than 1 month ago. The problem occurs every several days. The problem has been waxing and waning since onset. The pain is present in the lumbar spine. The quality of the pain is described as cramping. The pain radiates to the right knee and right thigh. The pain is at a  severity of 9/10. The pain is the same all the time. The symptoms are aggravated by standing. Stiffness is present all day. Associated symptoms include leg pain and tingling. Pertinent negatives include no abdominal pain, bladder incontinence, chest pain, dysuria, headaches, paresis, paresthesias, perianal numbness or weight loss. Risk factors include lack of exercise.     Objective   Vital Signs:   There were no vitals taken for this visit.    Physical Exam   Constitutional: She appears well-developed and well-nourished. No distress.   Pulmonary/Chest: Effort normal.   Musculoskeletal:         General: Tenderness (right hip and lower back) present.   Neurological: She is alert.   Skin: Skin is warm and dry.   Psychiatric: She has a normal mood and affect.     Result Review :   The following data was reviewed by: LOYDA Stephens on 12/23/2022:  Common labs    Common Labs 7/6/22 9/16/22 9/16/22     1101 1101   Glucose  120 (A)    BUN  9    Creatinine 0.70 0.75    Sodium  141    Potassium  3.7    Chloride  105    Calcium  9.5    Albumin  4.70    Total Bilirubin  0.5    Alkaline Phosphatase  61    AST (SGOT)  23    ALT (SGPT)  18    Total Cholesterol   89   Triglycerides   76   HDL Cholesterol   37 (A)   LDL Cholesterol    36   (A) Abnormal value       Comments are available for some flowsheets but are not being displayed.           Data reviewed: Previous office note          Assessment and Plan    Diagnoses and all orders for this visit:    1. Acute midline low back pain with right-sided sciatica (Primary)  Comments:  Continue with NSAIDs muscle relaxers we will send an order over for physical therapy to evaluate and treat.  Patient is agreeable    2. Right hip pain  Comments:  We will see patient on Friday, 12/30/2022 for right hip bursa injections.        Follow Up   Return in about 1 week (around 12/30/2022) for bursa injection.  Patient was given instructions and counseling regarding her condition or  for health maintenance advice. Please see specific information pulled into the AVS if appropriate.     Mode of Visit: Video  Location of patient: home  Location of provider: home  You have chosen to receive care through a telehealth visit.  The patient has signed the video visit consent form.  The visit included audio and video interaction. No technical issues occurred during this visit.

## 2022-12-27 NOTE — TELEPHONE ENCOUNTER
PATIENT STATES THE [facility] PAPERWORK SHOULD STATE OFF FROM 12/20-1/15. PATIENT WOULD LIKE THIS UPDATED AND FAXED BEFORE 1PM TODAY

## 2022-12-28 ENCOUNTER — TELEPHONE (OUTPATIENT)
Dept: FAMILY MEDICINE CLINIC | Facility: CLINIC | Age: 37
End: 2022-12-28

## 2022-12-28 NOTE — TELEPHONE ENCOUNTER
Patient has needed her FMLA forms filled correctly.  They were faxed to her work incorrectly again.  Her work stated that #3 needed to be checked yes, #7 needed to be checked injury, and needs facility name and signature.   Patient needs this signed and corrected today.

## 2022-12-30 ENCOUNTER — OFFICE VISIT (OUTPATIENT)
Dept: FAMILY MEDICINE CLINIC | Facility: CLINIC | Age: 37
End: 2022-12-30

## 2022-12-30 VITALS
TEMPERATURE: 97.1 F | DIASTOLIC BLOOD PRESSURE: 102 MMHG | HEIGHT: 68 IN | WEIGHT: 212.6 LBS | HEART RATE: 93 BPM | BODY MASS INDEX: 32.22 KG/M2 | OXYGEN SATURATION: 95 % | SYSTOLIC BLOOD PRESSURE: 154 MMHG

## 2022-12-30 DIAGNOSIS — M70.61 GREATER TROCHANTERIC BURSITIS OF RIGHT HIP: Primary | ICD-10-CM

## 2022-12-30 PROCEDURE — 99213 OFFICE O/P EST LOW 20 MIN: CPT | Performed by: NURSE PRACTITIONER

## 2022-12-30 PROCEDURE — 20550 NJX 1 TENDON SHEATH/LIGAMENT: CPT | Performed by: NURSE PRACTITIONER

## 2022-12-30 NOTE — PROGRESS NOTES
Chief Complaint  Procedure and Right hip bursitis injection    Subjective        Medical History: has a past medical history of Asthma, Asthma (03/14/2017), Depression, Essential hypertension (03/14/2017), Hypertension, PCOS (polycystic ovarian syndrome), and STD (female).     Surgical History: has no past surgical history on file.     Family History: family history includes Arthritis in her father and mother; Asthma in her father and mother; Cancer in her maternal grandmother; Hypertension in her father and mother.     Social History: reports that she has never smoked. She has never used smokeless tobacco. She reports that she does not drink alcohol and does not use drugs.    Renny Cole presents to [facility]  Back Pain  This is a recurrent problem. The current episode started 1 to 4 weeks ago. The problem occurs every several days. The problem has been rapidly improving since onset. The pain is present in the sacro-iliac. The quality of the pain is described as stabbing. The pain is at a severity of 8/10. The pain is worse during the night. The symptoms are aggravated by position and standing. Stiffness is present all day. Associated symptoms include abdominal pain, headaches, leg pain, numbness and weakness. Pertinent negatives include no bladder incontinence, bowel incontinence, chest pain, dysuria, fever, paresis, paresthesias, pelvic pain, perianal numbness, tingling or weight loss.   Hip Pain   The incident occurred more than 1 week ago. There was no injury mechanism. The pain is present in the right hip. The pain is moderate. The pain has been intermittent since onset. Associated symptoms include numbness. Pertinent negatives include no tingling. She reports no foreign bodies present. The symptoms are aggravated by palpation and weight bearing. She has tried NSAIDs, rest and non-weight bearing for the symptoms. The treatment provided moderate relief.       Objective  "  Vital Signs:   BP (!) 154/102   Pulse 93   Temp 97.1 °F (36.2 °C)   Ht 172.7 cm (68\")   Wt 96.4 kg (212 lb 9.6 oz)   SpO2 95%   BMI 32.33 kg/m²       Wt Readings from Last 3 Encounters:   12/30/22 96.4 kg (212 lb 9.6 oz)   12/12/22 98.4 kg (217 lb)   11/28/22 96.2 kg (212 lb)        BP Readings from Last 3 Encounters:   12/30/22 (!) 154/102   12/12/22 158/98   11/28/22 148/100               Physical Exam  Vitals reviewed.   Constitutional:       Appearance: Normal appearance. She is well-developed.   HENT:      Head: Normocephalic and atraumatic.   Eyes:      Conjunctiva/sclera: Conjunctivae normal.      Pupils: Pupils are equal, round, and reactive to light.   Pulmonary:      Effort: Pulmonary effort is normal.   Musculoskeletal:      Right hip: Tenderness present.   Skin:     General: Skin is warm and dry.   Neurological:      Mental Status: She is alert and oriented to person, place, and time.   Psychiatric:         Mood and Affect: Mood and affect normal.         Behavior: Behavior normal.         Thought Content: Thought content normal.         Judgment: Judgment normal.        Result Review :  {The following data was reviewed by LOYDA Stephens on 12/30/2022.  Common labs    Common Labs 7/6/22 9/16/22 9/16/22     1101 1101   Glucose  120 (A)    BUN  9    Creatinine 0.70 0.75    Sodium  141    Potassium  3.7    Chloride  105    Calcium  9.5    Albumin  4.70    Total Bilirubin  0.5    Alkaline Phosphatase  61    AST (SGOT)  23    ALT (SGPT)  18    Total Cholesterol   89   Triglycerides   76   HDL Cholesterol   37 (A)   LDL Cholesterol    36   (A) Abnormal value       Comments are available for some flowsheets but are not being displayed.              - Injection Tendon or Ligament    Date/Time: 12/30/2022 10:27 AM  Performed by: Pippa Yeboah APRN  Authorized by: Pippa Yeboah APRN   Preparation: Patient was prepped and draped in the usual sterile fashion.  Local anesthesia " used: yes    Anesthesia:  Local anesthesia used: yes  Local Anesthetic: co-phenylcaine spray    Sedation:  Patient sedated: no    Patient tolerance: patient tolerated the procedure well with no immediate complications  Medications administered: 8 mL lidocaine 1 %, 1 mL triamcinolone acetonide 40 MG/ML           Risk and benefits of right greater trochanter bursa injection were discussed with the patient prior to initiating the procedure.  Risks include risk of infection, increased blood pressure or blood glucose.  Patient was given opportunity to ask questions.  Patient provided verbal and written consent to treatment.  Patient was placed in the left lateral recumbent position with the head of the examination table elevated to 30 degrees.  y.  Tender nod areas of the hip ule was identified.  Area was prepped with alcohol followed by iodine.  Afterwards a 25-gauge 1.5 inch was inserted to the tender bursa.. Fluid was injected in multiple sites of the bursa  from a 10 mL syringe containing 8 ml of 1% lidocaine without epinephrine and 80 mg of Kenalog.  After the solution was injected the needle was removed and pressure was placed.  Area was cleansed with alcohol and a Band-Aid was placed over top.  Patient overall tolerated the procedure well and was in no acute distress afterwards.  No immediate complications noted.    Current Outpatient Medications on File Prior to Visit   Medication Sig Dispense Refill   • acyclovir (ZOVIRAX) 400 MG tablet TAKE 1 TABLET BY MOUTH DAILY. NO MORE THAN 5 DOSES A DAY 90 tablet 1   • hydroCHLOROthiazide (HYDRODIURIL) 25 MG tablet TAKE 1 TABLET BY MOUTH DAILY 90 tablet 0   • metFORMIN (GLUCOPHAGE) 500 MG tablet      • nabumetone (RELAFEN) 750 MG tablet Take 1 tablet by mouth 2 (Two) Times a Day. 60 tablet 2   • traMADol (ULTRAM) 50 MG tablet Take 1 tablet by mouth Every 6 (Six) Hours As Needed for Moderate Pain or Severe Pain. 60 tablet 0     No current facility-administered medications  on file prior to visit.        Assessment and Plan  Diagnoses and all orders for this visit:    1. Greater trochanteric bursitis of right hip (Primary)    Other orders  -     - Injection Tendon or Ligament        Follow Up   No follow-ups on file.  Patient was given instructions and counseling regarding her condition or for health maintenance advice. Please see specific information pulled into the AVS if appropriate.       Part of this note may be electronic transcription/translation of spoken language to printed text using the Dragon dictation system

## 2023-01-17 ENCOUNTER — TELEPHONE (OUTPATIENT)
Dept: FAMILY MEDICINE CLINIC | Facility: CLINIC | Age: 38
End: 2023-01-17
Payer: COMMERCIAL

## 2023-01-17 NOTE — TELEPHONE ENCOUNTER
Caller: Renny Cole    Relationship: Self    Best call back number: [phone]    What form or medical record are you requesting: LETTER STATING RESTRICTIONS AT WORK    Who is requesting this form or medical record from you: EMPLOYER    How would you like to receive the form or medical records (pick-up, mail, fax): PICK-UP    Timeframe paperwork needed: AS SOON AS POSSIBLE    Additional notes: PATIENT CALLED AND STATED THAT SHE JUST STARTED BACK TO WORK YESTERDAY 1.16.23. AND NEEDS A LETTER STATING THAT SHE NEEDS TO SIT AT WORK BECAUSE OF HER HIP AND BACK PAIN.

## 2023-01-20 ENCOUNTER — TELEPHONE (OUTPATIENT)
Dept: FAMILY MEDICINE CLINIC | Facility: CLINIC | Age: 38
End: 2023-01-20

## 2023-01-24 ENCOUNTER — PATIENT MESSAGE (OUTPATIENT)
Dept: FAMILY MEDICINE CLINIC | Facility: CLINIC | Age: 38
End: 2023-01-24
Payer: COMMERCIAL

## 2023-04-22 ENCOUNTER — TELEPHONE (OUTPATIENT)
Dept: URGENT CARE | Facility: CLINIC | Age: 38
End: 2023-04-22

## 2023-04-22 PROCEDURE — 87491 CHLMYD TRACH DNA AMP PROBE: CPT | Performed by: EMERGENCY MEDICINE

## 2023-04-22 PROCEDURE — 87591 N.GONORRHOEAE DNA AMP PROB: CPT | Performed by: EMERGENCY MEDICINE

## 2023-04-22 NOTE — TELEPHONE ENCOUNTER
Patient is contacted regarding positive chlamydia result.  Plan: Complete the course of doxycycline as discussed during encounter.  Continue instructions as discussed during encounter, please see plan of care in the encounter.

## 2023-05-09 ENCOUNTER — TELEPHONE (OUTPATIENT)
Dept: FAMILY MEDICINE CLINIC | Facility: CLINIC | Age: 38
End: 2023-05-09
Payer: COMMERCIAL

## 2023-05-09 NOTE — TELEPHONE ENCOUNTER
Patient missed appointment on 05/05/2023 @ 2:30 with Pippa Yeboah. Spoke to patient. Forgot appointment. Explained policy concerning missed appointments and same day cancellations.

## 2023-06-01 RX ORDER — DICLOFENAC SODIUM 75 MG/1
TABLET, DELAYED RELEASE ORAL
Qty: 60 TABLET | Refills: 1 | OUTPATIENT
Start: 2023-06-01

## 2023-07-25 ENCOUNTER — TELEPHONE (OUTPATIENT)
Dept: FAMILY MEDICINE CLINIC | Facility: CLINIC | Age: 38
End: 2023-07-25
Payer: COMMERCIAL

## 2023-07-25 NOTE — TELEPHONE ENCOUNTER
Caller: Renny Cole    Relationship: Self    Best call back number: [phone]    What form or medical record are you requesting: [facility] FORM     Who is requesting this form or medical record from you: EMPLOYER    How would you like to receive the form or medical records (pick-up, mail, fax):   If fax, what is the fax number: [phone]    Timeframe paperwork needed: AS SOON AS POSSIBLE    Additional notes: COMPANY STATES FMLA PAPERS WERE NEVER RECEIVED PLEASE RESEND TO THE ABOVE FAX NUMBER, PATIENT IS IN DANGER OF LOSING HER JOB

## 2023-07-26 NOTE — TELEPHONE ENCOUNTER
PATIENT ADVISED FMLA FORM WAS FAXED AGAIN AND SUCCESSFULLY SENT VIA FAX [phone]. SHE WAS ADVISED TO CALL AND IF FAX IS STILL NOT RECEIVED TO COME GET A COPY TO TAKE TO HER HR IN PERSON. PATIENT VOICED UNDERSTANDING.

## 2023-11-03 RX ORDER — ACYCLOVIR 400 MG/1
TABLET ORAL
Qty: 90 TABLET | Refills: 1 | Status: SHIPPED | OUTPATIENT
Start: 2023-11-03

## 2023-11-27 ENCOUNTER — OFFICE VISIT (OUTPATIENT)
Dept: FAMILY MEDICINE CLINIC | Facility: CLINIC | Age: 38
End: 2023-11-27
Payer: COMMERCIAL

## 2023-11-27 VITALS
OXYGEN SATURATION: 98 % | HEIGHT: 68 IN | WEIGHT: 221.1 LBS | TEMPERATURE: 97.5 F | SYSTOLIC BLOOD PRESSURE: 130 MMHG | DIASTOLIC BLOOD PRESSURE: 92 MMHG | BODY MASS INDEX: 33.51 KG/M2 | HEART RATE: 88 BPM

## 2023-11-27 DIAGNOSIS — Z00.00 ANNUAL PHYSICAL EXAM: Primary | ICD-10-CM

## 2023-11-27 DIAGNOSIS — F32.89 OTHER DEPRESSION: ICD-10-CM

## 2023-11-27 DIAGNOSIS — N97.9 FEMALE FERTILITY PROBLEM: ICD-10-CM

## 2023-11-27 DIAGNOSIS — M79.674 PAIN AND SWELLING OF TOE OF RIGHT FOOT: ICD-10-CM

## 2023-11-27 DIAGNOSIS — M79.89 PAIN AND SWELLING OF TOE OF RIGHT FOOT: ICD-10-CM

## 2023-11-27 RX ORDER — HYDROCODONE BITARTRATE AND ACETAMINOPHEN 5; 325 MG/1; MG/1
1 TABLET ORAL EVERY 6 HOURS PRN
Qty: 15 TABLET | Refills: 0 | Status: SHIPPED | OUTPATIENT
Start: 2023-11-27

## 2023-11-27 RX ORDER — INDOMETHACIN 50 MG/1
50 CAPSULE ORAL 3 TIMES DAILY PRN
Qty: 60 CAPSULE | Refills: 3 | Status: SHIPPED | OUTPATIENT
Start: 2023-11-27

## 2023-11-27 NOTE — PROGRESS NOTES
"  ENCOUNTER DATE:  11/27/2023    Renny Jacinto Douglas / 38 y.o. / female      CHIEF COMPLAINT     Toe Pain      VITALS     Visit Vitals  /92   Pulse 88   Temp 97.5 °F (36.4 °C)   Ht 172.7 cm (68\")   Wt 100 kg (221 lb 1.6 oz)   LMP 11/05/2023 (Exact Date)   SpO2 98%   BMI 33.62 kg/m²       BP Readings from Last 3 Encounters:   11/27/23 130/92   11/16/23 148/84   11/11/23 144/80     Wt Readings from Last 3 Encounters:   11/27/23 100 kg (221 lb 1.6 oz)   11/16/23 100 kg (221 lb)   11/11/23 100 kg (221 lb)      Body mass index is 33.62 kg/m².    MEDICATIONS     Current Outpatient Medications on File Prior to Visit   Medication Sig Dispense Refill    acyclovir (ZOVIRAX) 400 MG tablet TAKE 1 TABLET BY MOUTH DAILY. NO MORE THAN 5 DOSES A DAY 90 tablet 1    hydroCHLOROthiazide (HYDRODIURIL) 25 MG tablet TAKE 1 TABLET BY MOUTH DAILY 90 tablet 0    [DISCONTINUED] ibuprofen (ADVIL,MOTRIN) 800 MG tablet Take 1 tablet by mouth Every 8 (Eight) Hours As Needed for Mild Pain or Moderate Pain. 12 tablet 0     No current facility-administered medications on file prior to visit.         HISTORY OF PRESENT ILLNESS     Renny presents for annual health maintenance visit.  Lab Results   Component Value Date    HPVAPTIMA Negative 09/16/2022      Last health maintenance visit: approximately 1 year ago  General health: some medical problems  Lifestyle:  Attempting to lose weight?: Yes   Diet: eats moderately healthy  Exercise: generally stays active (but no regular exercise)  Tobacco: Never used   Alcohol: does not drink  Work: Full-time  Reproductive health:  Sexually active?: Yes   Sexual problems?: No problems  Concern for STD?: No    Sees Gynecologist?: Yes   Korin/Postmenopausal?: No   Domestic abuse concerns: No   Depression Screening:          PHQ-9 Depression Screening  Little interest or pleasure in doing things?     Feeling down, depressed, or hopeless?     Trouble falling or staying asleep, or sleeping too much?     Feeling " tired or having little energy?     Poor appetite or overeating?     Feeling bad about yourself - or that you are a failure or have let yourself or your family down?     Trouble concentrating on things, such as reading the newspaper or watching television?     Moving or speaking so slowly that other people could have noticed? Or the opposite - being so fidgety or restless that you have been moving around a lot more than usual?     Thoughts that you would be better off dead, or of hurting yourself in some way?     PHQ-9 Total Score     If you checked off any problems, how difficult have these problems made it for you to do your work, take care of things at home, or get along with other people?           MARIAMA-7           Patient Care Team:  Pippa Yeboah APRN as PCP - General (Nurse Practitioner)  Provider, No Known      ALLERGIES  No Known Allergies     PFSH:     The following portions of the patient's history were reviewed and updated as appropriate: Allergies / Current Medications / Past Medical History / Surgical History / Social History / Family History    PROBLEM LIST   There is no problem list on file for this patient.      PAST MEDICAL HISTORY  Past Medical History:   Diagnosis Date    Asthma     Asthma 03/14/2017    Depression     Essential hypertension 03/14/2017    Hypertension     PCOS (polycystic ovarian syndrome)     STD (female)     GENITAL HERPES; TRICHOMONIASIS       SURGICAL HISTORY  History reviewed. No pertinent surgical history.    SOCIAL HISTORY  Social History     Socioeconomic History    Marital status: Single   Tobacco Use    Smoking status: Never    Smokeless tobacco: Never   Vaping Use    Vaping Use: Never used   Substance and Sexual Activity    Alcohol use: Never    Drug use: Never    Sexual activity: Yes     Partners: Male     Birth control/protection: Condom, None       FAMILY HISTORY  Family History   Problem Relation Age of Onset    Arthritis Mother     Asthma Mother      Hypertension Mother     Arthritis Father     Asthma Father     Hypertension Father     Cancer Maternal Grandmother         unknown    Stroke Neg Hx     Diabetes Neg Hx        IMMUNIZATION HISTORY  Immunization History   Administered Date(s) Administered    COVID-19 (PFIZER) Purple Cap Monovalent 08/02/2021, 08/23/2021    MMR 08/15/1996    Td (TDVAX) 04/12/1999       HPI  Toe Pain     Patient is a 38-year-old female who comes in today with persistent pain in right great toe, incident for started 11/11/2023 when patient stubbed the end of the right great toe on a metal leg.  X-ray was done and there was no fracture or dislocation or significant bony abnormality seen on 11/11/2023.  Patient return back to urgent care 11/16/2023 with persistent right great toe pain.  Patient has been using Tylenol and Advil as needed for pain, she had been wearing heavy, boots without a very uncomfortable, the pain was persistent with mild swelling and bruising.  At that time she was given a postop shoe to wear, prescription for 800 of ibuprofen to take every 8 hours as needed for pain it was recommended she apply ice to the area 20 minutes 4 times daily and should be seeing pain improve over the next 1 to 2 weeks.  Patient comes into the office today for follow-up.  Patient states the pain is persistent and can be severe at times.  She continues to have swelling of the right big toe.  She states she is taking the Advil without any improvement of symptoms.  Patient's been unable to work due to the pain, and unable to wear postop shoe or boot to work.    Unable to conceive  Patient has been  unable to conceive, she has had fertility issues, she has been with her current boyfriend for the past 8 years.  Her current boyfriend does have 2 other children who are almost grown.  Patient states that she is always wanted a child and cannot understand why she is unable to have 1.  She is not currently seeing GYN, has never been on any fertility  medications to help with conceiving.    Depression  Visit Type: initial  Onset of symptoms: 1-4 weeks ago  Progression since onset: gradually worsening  Patient presents with the following symptoms: decreased concentration, depressed mood, excessive worry, fatigue, feelings of hopelessness, feelings of worthlessness, hypersomnia, irritability, muscle tension, nervousness/anxiety, palpitations, panic, restlessness, suicidal ideas (no plan feeling like she would not be missed if she dies) and thoughts of death.  Patient is not experiencing: shortness of breath and suicidal planning.  Frequency of symptoms: constantly   Severity: interfering with daily activities   Aggravated by: family issues  Risk factors: major life event  No history of: suicide attempt  Treatments tried: Time off work, and getting away from the stressors within the home.  Improvement on treatment: moderate  Patient would like a referral to get  get therapy for the depression          Physical Exam  Vitals reviewed.   Constitutional:       Appearance: Normal appearance. She is well-developed.   HENT:      Head: Normocephalic and atraumatic.   Eyes:      Conjunctiva/sclera: Conjunctivae normal.      Pupils: Pupils are equal, round, and reactive to light.   Cardiovascular:      Rate and Rhythm: Normal rate and regular rhythm.      Heart sounds: No murmur heard.  Pulmonary:      Effort: Pulmonary effort is normal.      Breath sounds: Normal breath sounds. No wheezing or rhonchi.   Musculoskeletal:      Comments: Swelling of right great toe, mild bursing, no improvement in pain   Skin:     General: Skin is warm and dry.   Neurological:      Mental Status: She is alert and oriented to person, place, and time.   Psychiatric:         Mood and Affect: Mood and affect normal.         Behavior: Behavior normal.         Thought Content: Thought content normal.         Judgment: Judgment normal.         REVIEWED DATA      Labs:    Lab Results   Component Value  "Date     09/16/2022    K 3.7 09/16/2022    CALCIUM 9.5 09/16/2022    AST 23 09/16/2022    ALT 18 09/16/2022    BUN 9 09/16/2022    CREATININE 0.75 09/16/2022    CREATININE 0.70 07/06/2022    CREATININE 0.74 08/17/2020       Lab Results   Component Value Date    TSH 0.983 09/16/2022       Lab Results   Component Value Date    LDL 36 09/16/2022    HDL 37 (L) 09/16/2022    TRIG 76 09/16/2022    CHOLHDLRATIO 2.7 (L) 08/17/2020       Lab Results   Component Value Date    YIYF20DF 13.1 (L) 12/31/2019        Lab Results   Component Value Date    WBC 7.08 08/17/2020    HGB 13.5 08/17/2020    MCV 95.1 08/17/2020     08/17/2020       Lab Results   Component Value Date    LEUKOCYTESUR Trace (A) 12/10/2021        No results found for: \"HEPCVIRUSABY\"        ASSESSMENT & PLAN     Diagnoses and all orders for this visit:    1. Annual physical exam (Primary)    2. Pain and swelling of toe of right foot  Comments:  Patient currently has postop shoe, toe taped x-ray reviewed does show some abnormality on the right great toe refer to podiatry  Orders:  -     HYDROcodone-acetaminophen (Norco) 5-325 MG per tablet; Take 1 tablet by mouth Every 6 (Six) Hours As Needed for Severe Pain.  Dispense: 15 tablet; Refill: 0  -     Ambulatory Referral to Podiatry    3. Female fertility problem  Comments:  Will refer over to GYN for further work-up and management  Orders:  -     Ambulatory Referral to Obstetrics / Gynecology    4. Other depression  Comments:  Patient given a list of therapy to call and get an appointment with    Other orders  -     indomethacin (INDOCIN) 50 MG capsule; Take 1 capsule by mouth 3 (Three) Times a Day As Needed for Mild Pain.  Dispense: 60 capsule; Refill: 3        HEALTHCARE MAINTENANCE ISSUES     Cancer Screening:  Colon: Initial/Next screening at age: 44  Repeat colon cancer screening: N/A at this time  Breast: Recommended monthly self exams; annual professional exam  Mammogram: N/A at this " time  Cervical: pelvic exam as recommended by gyne  Skin: Monthly self skin examination, annual exam by health professional      Lifestyle Counseling:  Lifestyle Modifications: Attempt to lose weight, Continue good lifestyle choices/modifications, and Improve dietary compliance  Safety Issues: Always wear seatbelt, Avoid texting while driving   Use sunscreen, regular skin examination  Recommended annual dental/vision examination.  Emotional/Stress/Sleep: Reviewed and  given when appropriate    Part of this note may be electronic transcription/translation of spoken language to printed text using the Dragon dictation system

## 2023-11-27 NOTE — LETTER
November 27, 2023     Patient: Renny Cole   YOB: 1985   Date of Visit: 11/27/2023       To Whom It May Concern:    It is my medical opinion that Renny Cole may return to work 11/28/2023         Sincerely,        LOYDA Stephens    CC: No Recipients

## 2023-12-08 ENCOUNTER — PATIENT MESSAGE (OUTPATIENT)
Dept: FAMILY MEDICINE CLINIC | Facility: CLINIC | Age: 38
End: 2023-12-08
Payer: COMMERCIAL

## 2023-12-11 NOTE — TELEPHONE ENCOUNTER
From: Renny Cole  To: Pippa Yeboah  Sent: 12/8/2023 2:04 PM EST  Subject: Urgent     My foot is still bothering me I’m out of pain meds and I’m just hurting bad is their anyway u can write me a note for work saying I can only work 6-8 hours

## 2023-12-11 NOTE — PROGRESS NOTES
"Chief Complaint  Consult (BBR)    Subjective          History of Present Illness  Renny Cole is a 38 y.o. female who presents to [facility] as a consult from LOYDA Ga and would like to discuss breast reduction.  Her current bra size is a 44, D  cup.  She would like to be a B/C cup.  She patient does not have a personal or family history of breast cancer.  Neck, shoulders, and upper back pain present for 3 years.  Conservative treatments of vaseline, with little or temporary relief.  Experiences rashes, treats with baby powder.  Trouble sleeping, cannot get comfortable.  Trouble exercising, cannot do activities that she would like to do, generally has to wear many sports bras and has to special order bras.  Recommendations for today of breast reduction.  Recommendations for weight loss in order to lower the Schur requirement.     Allergies: Patient has no known allergies.  Allergies Reconciled.    Review of Systems  All system were reviewed and were negative, except the ones noted above.     Review of Systems     Objective     BP (!) 155/102 (BP Location: Left arm, Patient Position: Sitting, Cuff Size: Large Adult)   Pulse 97   Temp 98.9 °F (37.2 °C) (Temporal)   Ht 172.7 cm (67.99\")   Wt 98.4 kg (217 lb)   SpO2 94%   BMI 33.00 kg/m²     Body mass index is 33 kg/m².    Physical Exam   Cardiovascular: Normal rate.     Pulmonary/Chest  Effort normal.       Physical exam:  Patient awake, alert, oriented  Respirations are non elaborated  Patient is not tachycardic    Breast exam:   Masses: No masses  Nipple Discharge: No nipple discharge  Breast Symmetry:  Axillary Lymphadenopathy: No axillary lymphadenopathy  SN-N (Right Breast): 30.5  SN-N (Left Breast):32  SN-IMF (Right Breast):17  SN-IMF (Left Breast):15  N-IMF (Right Breast):20.5  N-IMF (Left Breast):21    Schnur Scale Score:  750  Body surface area is 2.12 meters squared.      Result Review " :                Assessment and Plan      Diagnoses and all orders for this visit:    1. Encounter for screening mammogram for malignant neoplasm of breast (Primary)  -     Mammo Screening Digital Tomosynthesis Bilateral With CAD; Future    2. Macromastia             Plan:  The patient was given literature in regards to the procedure and encouraged to visit the websites of [facility] and [facility].  Pictures obtained.  We will have the patient send the report or undergo a pre-operative mammogram.  We will have the patient obtain pre-operative clearance.  We discussed the procedure for bilateral breast reduction under general anesthesia as well as the postoperative recover time and the need for limitation of activities.  The risks of surgery were discussed including bleeding, infection, scarring (for which diagrams were drawn), pain, poor healing, loss of skin and or nipple, change in nipple sensation, inability to breast feed, asymmetry, no guarantee of post-operative cup size.    I think that this patient is an excellent candidate for a breast reduction.  If feel that this procedure is medically necessary to relieve her symptoms.  I would anticipate resecting at least 750 grams of breast tissue from each breast.  We will contact the insurance company for pre-authorization.    This patient has my office number to call with any further questions.   OR: 3 hrs under general anesthesia    Consent: bilateral breast reduction    CPT  [id number]- breast reduction    10136- Smoking and tobacco use cessation counseling visit; intensive, greater than 10 minutes    Pt will have mammogram before surgery.    I spent 30 minutes caring for Renny on this date of service. This time includes time spent by me in the following activities:performing a medically appropriate examination and/or evaluation , counseling and educating the patient/family/caregiver, documenting information in the medical record, and care coordination        Scribed by  Roma Kilpatrick, acting as a scribe for Lopez Vallejo MD, 12/20/23 09:11 EST.  Lopez Vallejo MD's signature on the note affirms that the note adequately documents the care provided.                 Follow Up     Return RTC for pre op once surgery is scheduled.    Patient was given instructions and counseling regarding her condition. Please see specific information pulled into the AVS if appropriate.     Lopez Vallejo MD  12/20/2023

## 2023-12-13 DIAGNOSIS — M79.89 PAIN AND SWELLING OF TOE OF RIGHT FOOT: ICD-10-CM

## 2023-12-13 DIAGNOSIS — S99.921D INJURY OF TOE ON RIGHT FOOT, SUBSEQUENT ENCOUNTER: Primary | ICD-10-CM

## 2023-12-13 DIAGNOSIS — M79.674 PAIN AND SWELLING OF TOE OF RIGHT FOOT: ICD-10-CM

## 2023-12-13 RX ORDER — HYDROCODONE BITARTRATE AND ACETAMINOPHEN 5; 325 MG/1; MG/1
1 TABLET ORAL EVERY 6 HOURS PRN
Qty: 15 TABLET | Refills: 0 | Status: SHIPPED | OUTPATIENT
Start: 2023-12-13

## 2023-12-15 ENCOUNTER — OFFICE VISIT (OUTPATIENT)
Dept: FAMILY MEDICINE CLINIC | Facility: CLINIC | Age: 38
End: 2023-12-15
Payer: COMMERCIAL

## 2023-12-15 VITALS
BODY MASS INDEX: 32.8 KG/M2 | WEIGHT: 216.4 LBS | HEIGHT: 68 IN | HEART RATE: 101 BPM | OXYGEN SATURATION: 98 % | SYSTOLIC BLOOD PRESSURE: 138 MMHG | DIASTOLIC BLOOD PRESSURE: 84 MMHG | TEMPERATURE: 97 F

## 2023-12-15 DIAGNOSIS — Z09 FOLLOW-UP EXAM: Primary | ICD-10-CM

## 2023-12-15 DIAGNOSIS — Z11.3 ROUTINE SCREENING FOR STI (SEXUALLY TRANSMITTED INFECTION): ICD-10-CM

## 2023-12-15 LAB
C TRACH RRNA CVX QL NAA+PROBE: NOT DETECTED
CANDIDA SPECIES: NEGATIVE
GARDNERELLA VAGINALIS: POSITIVE
HAV IGM SERPL QL IA: NORMAL
HBV CORE IGM SERPL QL IA: NORMAL
HBV SURFACE AG SERPL QL IA: NORMAL
HCV AB SER DONR QL: NORMAL
N GONORRHOEA RRNA SPEC QL NAA+PROBE: NOT DETECTED
T VAGINALIS DNA VAG QL PROBE+SIG AMP: NEGATIVE

## 2023-12-15 PROCEDURE — 80074 ACUTE HEPATITIS PANEL: CPT | Performed by: NURSE PRACTITIONER

## 2023-12-15 PROCEDURE — 86695 HERPES SIMPLEX TYPE 1 TEST: CPT | Performed by: NURSE PRACTITIONER

## 2023-12-15 PROCEDURE — 87660 TRICHOMONAS VAGIN DIR PROBE: CPT | Performed by: NURSE PRACTITIONER

## 2023-12-15 PROCEDURE — 86696 HERPES SIMPLEX TYPE 2 TEST: CPT | Performed by: NURSE PRACTITIONER

## 2023-12-15 PROCEDURE — 87591 N.GONORRHOEAE DNA AMP PROB: CPT | Performed by: NURSE PRACTITIONER

## 2023-12-15 PROCEDURE — 87491 CHLMYD TRACH DNA AMP PROBE: CPT | Performed by: NURSE PRACTITIONER

## 2023-12-15 PROCEDURE — G0432 EIA HIV-1/HIV-2 SCREEN: HCPCS | Performed by: NURSE PRACTITIONER

## 2023-12-15 PROCEDURE — 87480 CANDIDA DNA DIR PROBE: CPT | Performed by: NURSE PRACTITIONER

## 2023-12-15 PROCEDURE — 86592 SYPHILIS TEST NON-TREP QUAL: CPT | Performed by: NURSE PRACTITIONER

## 2023-12-15 PROCEDURE — 87510 GARDNER VAG DNA DIR PROBE: CPT | Performed by: NURSE PRACTITIONER

## 2023-12-15 NOTE — PROGRESS NOTES
"Chief Complaint  STD SCREENING     Subjective        Medical History: has a past medical history of Asthma, Asthma (03/14/2017), Depression, Essential hypertension (03/14/2017), Hypertension, PCOS (polycystic ovarian syndrome), and STD (female).     Surgical History: has no past surgical history on file.     Family History: family history includes Arthritis in her father and mother; Asthma in her father and mother; Cancer in her maternal grandmother; Hypertension in her father and mother.     Social History: reports that she has never smoked. She has never used smokeless tobacco. She reports that she does not drink alcohol and does not use drugs.    Renny Cole presents to [facility]  History of Present Illness    Patient wanted STD screening, no symptoms just want to be safe.  Patient recently broke up with longtime boyfriend, wants to make sure she was not exposed to any STDs.  She is having absolutely no symptoms, no vaginal discharge, itching, she just wants a screening done today.    Objective   Vital Signs:   /84   Pulse 101   Temp 97 °F (36.1 °C)   Ht 172.7 cm (68\")   Wt 98.2 kg (216 lb 6.4 oz)   SpO2 98%   BMI 32.90 kg/m²       Wt Readings from Last 3 Encounters:   12/15/23 98.2 kg (216 lb 6.4 oz)   11/27/23 100 kg (221 lb 1.6 oz)   11/16/23 100 kg (221 lb)        BP Readings from Last 3 Encounters:   12/15/23 138/84   11/27/23 130/92   11/16/23 148/84      Physical Exam  Vitals reviewed.   Constitutional:       Appearance: Normal appearance. She is well-developed. She is obese.   HENT:      Head: Normocephalic and atraumatic.   Eyes:      Conjunctiva/sclera: Conjunctivae normal.      Pupils: Pupils are equal, round, and reactive to light.   Cardiovascular:      Rate and Rhythm: Normal rate and regular rhythm.      Heart sounds: No murmur heard.  Pulmonary:      Effort: Pulmonary effort is normal.      Breath sounds: Normal breath sounds. No wheezing or " rhonchi.   Skin:     General: Skin is warm and dry.   Neurological:      Mental Status: She is alert and oriented to person, place, and time.   Psychiatric:         Mood and Affect: Mood and affect normal.         Behavior: Behavior normal.         Thought Content: Thought content normal.         Judgment: Judgment normal.        Result Review :  {The following data was reviewed by LOYDA Stephens on 12/15/2023.                Current Outpatient Medications on File Prior to Visit   Medication Sig Dispense Refill    acyclovir (ZOVIRAX) 400 MG tablet TAKE 1 TABLET BY MOUTH DAILY. NO MORE THAN 5 DOSES A DAY 90 tablet 1    hydroCHLOROthiazide (HYDRODIURIL) 25 MG tablet TAKE 1 TABLET BY MOUTH DAILY 90 tablet 0    HYDROcodone-acetaminophen (Norco) 5-325 MG per tablet Take 1 tablet by mouth Every 6 (Six) Hours As Needed for Severe Pain. 15 tablet 0    indomethacin (INDOCIN) 50 MG capsule Take 1 capsule by mouth 3 (Three) Times a Day As Needed for Mild Pain. 60 capsule 3     No current facility-administered medications on file prior to visit.        Assessment and Plan  Diagnoses and all orders for this visit:    1. Follow-up exam (Primary)    2. Routine screening for STI (sexually transmitted infection)  -     RPR  -     HSV 1 and 2-Specific Ab, IgG  -     HIV-1/O/2 ANTIGEN/ANTIBODY, 4TH GENERATION  -     Hepatitis panel, acute  -     Chlamydia trachomatis, Neisseria gonorrhoeae, PCR - , Urine, Clean Catch  -     Gardnerella vaginalis, Trichomonas vaginalis, Candida albicans, DNA - Swab, Vagina        Follow Up   Return for If symptoms do not improve new concerning symptoms.  Patient was given instructions and counseling regarding her condition or for health maintenance advice. Please see specific information pulled into the AVS if appropriate.       Part of this note may be electronic transcription/translation of spoken language to printed text using the Dragon dictation system

## 2023-12-16 LAB
HIV 1+2 AB+HIV1 P24 AG SERPL QL IA: NORMAL
RPR SER QL: NORMAL

## 2023-12-17 LAB
HSV1 IGG SER IA-ACNC: <0.91 INDEX (ref 0–0.9)
HSV2 IGG SER IA-ACNC: 4.38 INDEX (ref 0–0.9)

## 2023-12-18 RX ORDER — METRONIDAZOLE 7.5 MG/G
GEL VAGINAL NIGHTLY
Qty: 70 G | Refills: 0 | Status: SHIPPED | OUTPATIENT
Start: 2023-12-18 | End: 2023-12-25

## 2023-12-20 ENCOUNTER — PREP FOR SURGERY (OUTPATIENT)
Dept: OTHER | Facility: HOSPITAL | Age: 38
End: 2023-12-20
Payer: COMMERCIAL

## 2023-12-20 ENCOUNTER — OFFICE VISIT (OUTPATIENT)
Dept: PLASTIC SURGERY | Facility: CLINIC | Age: 38
End: 2023-12-20
Payer: COMMERCIAL

## 2023-12-20 VITALS
SYSTOLIC BLOOD PRESSURE: 155 MMHG | DIASTOLIC BLOOD PRESSURE: 102 MMHG | WEIGHT: 217 LBS | OXYGEN SATURATION: 94 % | BODY MASS INDEX: 32.89 KG/M2 | HEART RATE: 97 BPM | HEIGHT: 68 IN | TEMPERATURE: 98.9 F

## 2023-12-20 DIAGNOSIS — Z12.31 ENCOUNTER FOR SCREENING MAMMOGRAM FOR MALIGNANT NEOPLASM OF BREAST: Primary | ICD-10-CM

## 2023-12-20 DIAGNOSIS — N62 MACROMASTIA: ICD-10-CM

## 2023-12-20 DIAGNOSIS — N62 MACROMASTIA: Primary | ICD-10-CM

## 2023-12-20 PROBLEM — C50.919 BREAST CANCER: Status: ACTIVE | Noted: 2023-12-20

## 2023-12-20 RX ORDER — CEFAZOLIN SODIUM 2 G/100ML
2000 INJECTION, SOLUTION INTRAVENOUS ONCE
OUTPATIENT
Start: 2023-12-20 | End: 2023-12-20

## 2023-12-20 RX ORDER — ACETAMINOPHEN 500 MG
1000 TABLET ORAL ONCE
OUTPATIENT
Start: 2023-12-20 | End: 2023-12-20

## 2023-12-20 RX ORDER — SCOLOPAMINE TRANSDERMAL SYSTEM 1 MG/1
1 PATCH, EXTENDED RELEASE TRANSDERMAL CONTINUOUS
OUTPATIENT
Start: 2023-12-20 | End: 2023-12-23

## 2023-12-27 ENCOUNTER — PATIENT ROUNDING (BHMG ONLY) (OUTPATIENT)
Dept: PLASTIC SURGERY | Facility: CLINIC | Age: 38
End: 2023-12-27
Payer: COMMERCIAL

## 2023-12-27 ENCOUNTER — PATIENT MESSAGE (OUTPATIENT)
Dept: PLASTIC SURGERY | Facility: CLINIC | Age: 38
End: 2023-12-27
Payer: COMMERCIAL

## 2023-12-27 NOTE — PROGRESS NOTES
[place]re message has been sent to the patient for [place] [facility] [place]

## 2024-01-07 DIAGNOSIS — M79.89 PAIN AND SWELLING OF TOE OF RIGHT FOOT: ICD-10-CM

## 2024-01-07 DIAGNOSIS — M79.674 PAIN AND SWELLING OF TOE OF RIGHT FOOT: ICD-10-CM

## 2024-01-08 RX ORDER — ACYCLOVIR 400 MG/1
TABLET ORAL
Qty: 90 TABLET | Refills: 1 | OUTPATIENT
Start: 2024-01-08

## 2024-01-08 RX ORDER — HYDROCODONE BITARTRATE AND ACETAMINOPHEN 5; 325 MG/1; MG/1
1 TABLET ORAL EVERY 6 HOURS PRN
Qty: 15 TABLET | Refills: 0 | OUTPATIENT
Start: 2024-01-08

## 2024-01-30 RX ORDER — ACYCLOVIR 400 MG/1
TABLET ORAL
Qty: 90 TABLET | Refills: 1 | Status: SHIPPED | OUTPATIENT
Start: 2024-01-30

## 2024-02-05 ENCOUNTER — APPOINTMENT (OUTPATIENT)
Dept: GENERAL RADIOLOGY | Facility: HOSPITAL | Age: 39
End: 2024-02-05
Payer: COMMERCIAL

## 2024-02-05 ENCOUNTER — HOSPITAL ENCOUNTER (EMERGENCY)
Facility: HOSPITAL | Age: 39
Discharge: HOME | End: 2024-02-05
Attending: EMERGENCY MEDICINE | Admitting: EMERGENCY MEDICINE
Payer: COMMERCIAL

## 2024-02-05 VITALS
RESPIRATION RATE: 18 BRPM | HEART RATE: 104 BPM | BODY MASS INDEX: 33.77 KG/M2 | WEIGHT: 215.17 LBS | OXYGEN SATURATION: 97 % | HEIGHT: 67 IN | DIASTOLIC BLOOD PRESSURE: 86 MMHG | TEMPERATURE: 97.6 F | SYSTOLIC BLOOD PRESSURE: 146 MMHG

## 2024-02-05 DIAGNOSIS — R07.89 ATYPICAL CHEST PAIN: Primary | ICD-10-CM

## 2024-02-05 LAB
ALBUMIN SERPL-MCNC: 3.6 G/DL (ref 3.5–5.2)
ALBUMIN/GLOB SERPL: 1.2 G/DL
ALP SERPL-CCNC: 56 U/L (ref 39–117)
ALT SERPL W P-5'-P-CCNC: 15 U/L (ref 1–33)
ANION GAP SERPL CALCULATED.3IONS-SCNC: 10.8 MMOL/L (ref 5–15)
AST SERPL-CCNC: 18 U/L (ref 1–32)
BASOPHILS # BLD AUTO: 0.01 10*3/MM3 (ref 0–0.2)
BASOPHILS NFR BLD AUTO: 0.2 % (ref 0–1.5)
BILIRUB SERPL-MCNC: 0.5 MG/DL (ref 0–1.2)
BUN SERPL-MCNC: 9 MG/DL (ref 6–20)
BUN/CREAT SERPL: 15.5 (ref 7–25)
CALCIUM SPEC-SCNC: 9.2 MG/DL (ref 8.6–10.5)
CHLORIDE SERPL-SCNC: 105 MMOL/L (ref 98–107)
CO2 SERPL-SCNC: 22.2 MMOL/L (ref 22–29)
CREAT SERPL-MCNC: 0.58 MG/DL (ref 0.57–1)
D DIMER PPP FEU-MCNC: 0.31 MCGFEU/ML (ref 0–0.5)
DEPRECATED RDW RBC AUTO: 37.9 FL (ref 37–54)
EGFRCR SERPLBLD CKD-EPI 2021: 118.2 ML/MIN/1.73
EOSINOPHIL # BLD AUTO: 0.1 10*3/MM3 (ref 0–0.4)
EOSINOPHIL NFR BLD AUTO: 1.8 % (ref 0.3–6.2)
ERYTHROCYTE [DISTWIDTH] IN BLOOD BY AUTOMATED COUNT: 11.9 % (ref 12.3–15.4)
FLUAV SUBTYP SPEC NAA+PROBE: NOT DETECTED
FLUBV RNA ISLT QL NAA+PROBE: NOT DETECTED
GEN 5 2HR TROPONIN T REFLEX: 8 NG/L
GLOBULIN UR ELPH-MCNC: 3 GM/DL
GLUCOSE SERPL-MCNC: 137 MG/DL (ref 65–99)
HCT VFR BLD AUTO: 35.8 % (ref 34–46.6)
HGB BLD-MCNC: 11.4 G/DL (ref 12–15.9)
HOLD SPECIMEN: NORMAL
HOLD SPECIMEN: NORMAL
IMM GRANULOCYTES # BLD AUTO: 0.01 10*3/MM3 (ref 0–0.05)
IMM GRANULOCYTES NFR BLD AUTO: 0.2 % (ref 0–0.5)
LIPASE SERPL-CCNC: 22 U/L (ref 13–60)
LYMPHOCYTES # BLD AUTO: 2.54 10*3/MM3 (ref 0.7–3.1)
LYMPHOCYTES NFR BLD AUTO: 45.3 % (ref 19.6–45.3)
MAGNESIUM SERPL-MCNC: 1.6 MG/DL (ref 1.6–2.6)
MCH RBC QN AUTO: 27.6 PG (ref 26.6–33)
MCHC RBC AUTO-ENTMCNC: 31.8 G/DL (ref 31.5–35.7)
MCV RBC AUTO: 86.7 FL (ref 79–97)
MONOCYTES # BLD AUTO: 0.5 10*3/MM3 (ref 0.1–0.9)
MONOCYTES NFR BLD AUTO: 8.9 % (ref 5–12)
NEUTROPHILS NFR BLD AUTO: 2.45 10*3/MM3 (ref 1.7–7)
NEUTROPHILS NFR BLD AUTO: 43.6 % (ref 42.7–76)
NRBC BLD AUTO-RTO: 0 /100 WBC (ref 0–0.2)
NT-PROBNP SERPL-MCNC: 322.3 PG/ML (ref 0–450)
PLATELET # BLD AUTO: 315 10*3/MM3 (ref 140–450)
PMV BLD AUTO: 9.3 FL (ref 6–12)
POTASSIUM SERPL-SCNC: 3.6 MMOL/L (ref 3.5–5.2)
PROT SERPL-MCNC: 6.6 G/DL (ref 6–8.5)
RBC # BLD AUTO: 4.13 10*6/MM3 (ref 3.77–5.28)
RSV RNA NPH QL NAA+NON-PROBE: NOT DETECTED
SARS-COV-2 RNA RESP QL NAA+PROBE: NOT DETECTED
SODIUM SERPL-SCNC: 138 MMOL/L (ref 136–145)
TROPONIN T DELTA: 2 NG/L
TROPONIN T SERPL HS-MCNC: 6 NG/L
WBC NRBC COR # BLD AUTO: 5.61 10*3/MM3 (ref 3.4–10.8)
WHOLE BLOOD HOLD COAG: NORMAL
WHOLE BLOOD HOLD SPECIMEN: NORMAL

## 2024-02-05 PROCEDURE — 83735 ASSAY OF MAGNESIUM: CPT | Performed by: EMERGENCY MEDICINE

## 2024-02-05 PROCEDURE — 83880 ASSAY OF NATRIURETIC PEPTIDE: CPT | Performed by: EMERGENCY MEDICINE

## 2024-02-05 PROCEDURE — 84484 ASSAY OF TROPONIN QUANT: CPT | Performed by: EMERGENCY MEDICINE

## 2024-02-05 PROCEDURE — 71045 X-RAY EXAM CHEST 1 VIEW: CPT

## 2024-02-05 PROCEDURE — 85025 COMPLETE CBC W/AUTO DIFF WBC: CPT

## 2024-02-05 PROCEDURE — 36415 COLL VENOUS BLD VENIPUNCTURE: CPT

## 2024-02-05 PROCEDURE — 80053 COMPREHEN METABOLIC PANEL: CPT | Performed by: EMERGENCY MEDICINE

## 2024-02-05 PROCEDURE — 87637 SARSCOV2&INF A&B&RSV AMP PRB: CPT | Performed by: EMERGENCY MEDICINE

## 2024-02-05 PROCEDURE — 96360 HYDRATION IV INFUSION INIT: CPT

## 2024-02-05 PROCEDURE — 85379 FIBRIN DEGRADATION QUANT: CPT | Performed by: EMERGENCY MEDICINE

## 2024-02-05 PROCEDURE — 25810000003 SODIUM CHLORIDE 0.9 % SOLUTION: Performed by: EMERGENCY MEDICINE

## 2024-02-05 PROCEDURE — 93005 ELECTROCARDIOGRAM TRACING: CPT | Performed by: EMERGENCY MEDICINE

## 2024-02-05 PROCEDURE — 83690 ASSAY OF LIPASE: CPT | Performed by: EMERGENCY MEDICINE

## 2024-02-05 PROCEDURE — 99284 EMERGENCY DEPT VISIT MOD MDM: CPT

## 2024-02-05 PROCEDURE — 93005 ELECTROCARDIOGRAM TRACING: CPT

## 2024-02-05 RX ORDER — ASPIRIN 81 MG/1
324 TABLET, CHEWABLE ORAL ONCE
Status: COMPLETED | OUTPATIENT
Start: 2024-02-05 | End: 2024-02-05

## 2024-02-05 RX ORDER — SODIUM CHLORIDE 0.9 % (FLUSH) 0.9 %
10 SYRINGE (ML) INJECTION AS NEEDED
Status: DISCONTINUED | OUTPATIENT
Start: 2024-02-05 | End: 2024-02-05 | Stop reason: HOSPADM

## 2024-02-05 RX ADMIN — ASPIRIN 81 MG CHEWABLE TABLET 324 MG: 81 TABLET CHEWABLE at 09:30

## 2024-02-05 RX ADMIN — SODIUM CHLORIDE 1000 ML: 9 INJECTION, SOLUTION INTRAVENOUS at 11:39

## 2024-02-05 NOTE — ED PROVIDER NOTES
Time: 9:49 AM EST  Date of encounter:  2/5/2024  Independent Historian/Clinical History and Information was obtained by:   Patient    History is limited by: N/A    Chief Complaint: Chest pain      History of Present Illness:  Patient is a 38 y.o. year old female who presents to the emergency department for evaluation of chest pain.  Patient states that she started having some chest pain last night.  States that she just has not felt well and also was having some pain when she is taking deep breaths.  Does have a history of hypertension, has been.  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  States that she just has not felt well for the last couple days.  Denies any nausea, vomiting, sweating.  Does report some aches as well as a headache.    HPI    Patient Care Team  Primary Care Provider: Pippa Yeboah APRN    Past Medical History:     No Known Allergies  Past Medical History:   Diagnosis Date    Asthma     Asthma 03/14/2017    Depression     Essential hypertension 03/14/2017    Hypertension     PCOS (polycystic ovarian syndrome)     STD (female)     GENITAL HERPES; TRICHOMONIASIS     History reviewed. No pertinent surgical history.  Family History   Problem Relation Age of Onset    Arthritis Mother     Asthma Mother     Hypertension Mother     Arthritis Father     Asthma Father     Hypertension Father     Cancer Maternal Grandmother         unknown    Stroke Neg Hx     Diabetes Neg Hx        Home Medications:  Prior to Admission medications    Medication Sig Start Date End Date Taking? Authorizing Provider   acyclovir (ZOVIRAX) 400 MG tablet TAKE 1 TABLET BY MOUTH DAILY. NO MORE THAN 5 DOSES A DAY 1/30/24   Pippa Yeboah APRN   hydroCHLOROthiazide (HYDRODIURIL) 25 MG tablet TAKE 1 TABLET BY MOUTH DAILY 2/7/22   Pippa Yeboah APRN   HYDROcodone-acetaminophen (Norco) 5-325 MG per tablet Take 1 tablet by mouth Every 6 (Six) Hours As Needed for Severe  "Pain. 12/13/23   Pippa Ybeoah APRN   indomethacin (INDOCIN) 50 MG capsule Take 1 capsule by mouth 3 (Three) Times a Day As Needed for Mild Pain. 11/27/23   Pippa Yeboah APRN        Social History:   Social History     Tobacco Use    Smoking status: Never    Smokeless tobacco: Never   Vaping Use    Vaping Use: Never used   Substance Use Topics    Alcohol use: Never    Drug use: Never         Review of Systems:  Review of Systems   Respiratory:  Positive for shortness of breath.    Cardiovascular:  Positive for chest pain.        Physical Exam:  /78   Pulse 112   Temp 97.6 °F (36.4 °C) (Oral)   Resp 18   Ht 170.2 cm (67\")   Wt 97.6 kg (215 lb 2.7 oz)   SpO2 98%   BMI 33.70 kg/m²     Physical Exam  Vitals and nursing note reviewed.   Constitutional:       Appearance: Normal appearance.   HENT:      Head: Normocephalic and atraumatic.   Eyes:      General: No scleral icterus.  Cardiovascular:      Rate and Rhythm: Regular rhythm. Tachycardia present.      Heart sounds: Normal heart sounds.   Pulmonary:      Effort: Pulmonary effort is normal.      Breath sounds: Normal breath sounds.   Abdominal:      Palpations: Abdomen is soft.      Tenderness: There is no abdominal tenderness.   Musculoskeletal:         General: Normal range of motion.      Cervical back: Normal range of motion.   Skin:     Findings: No rash.   Neurological:      General: No focal deficit present.      Mental Status: She is alert.                  Procedures:  Procedures      Medical Decision Making:      Comorbidities that affect care:    Asthma, Hypertension    External Notes reviewed:    Reviewed family medicine note from 12/15/2023      The following orders were placed and all results were independently analyzed by me:  Orders Placed This Encounter   Procedures    COVID-19, FLU A/B, RSV PCR 1 HR TAT - Swab, Nasopharynx    XR Chest 1 View    Noti Draw    High Sensitivity Troponin T    Comprehensive Metabolic " Panel    Lipase    BNP    Magnesium    CBC Auto Differential    D-dimer, Quantitative    High Sensitivity Troponin T 2Hr    NPO Diet NPO Type: Strict NPO    Undress & Gown    Continuous Pulse Oximetry    Oxygen Therapy- Nasal Cannula; Titrate 1-6 LPM Per SpO2; 90 - 95%    ECG 12 Lead ED Triage Standing Order; Chest Pain    ECG 12 Lead ED Triage Standing Order; Chest Pain    Insert Peripheral IV    CBC & Differential    Green Top (Gel)    Lavender Top    Gold Top - SST    Light Blue Top       Medications Given in the Emergency Department:  Medications   sodium chloride 0.9 % flush 10 mL (has no administration in time range)   sodium chloride 0.9 % bolus 1,000 mL (1,000 mL Intravenous New Bag 2/5/24 1139)   aspirin chewable tablet 324 mg (324 mg Oral Given 2/5/24 0930)        ED Course:    ED Course as of 02/05/24 1222   Mon Feb 05, 2024   0949 EKG interpreted by me  Time: 919  Heart rate 115  Sinus tach, normal QRS, borderline LVH [MA]      ED Course User Index  [MA] Alan Barnett MD       Labs:    Lab Results (last 24 hours)       Procedure Component Value Units Date/Time    High Sensitivity Troponin T [[id number]]  (Normal) Collected: 02/05/24 0925    Specimen: Blood Updated: 02/05/24 0956     HS Troponin T 6 ng/L     Narrative:      High Sensitive Troponin T Reference Range:  <14.0 ng/L- Negative Female for AMI  <22.0 ng/L- Negative Male for AMI  >=14 - Abnormal Female indicating possible myocardial injury.  >=22 - Abnormal Male indicating possible myocardial injury.   Clinicians would have to utilize clinical acumen, EKG, Troponin, and serial changes to determine if it is an Acute Myocardial Infarction or myocardial injury due to an underlying chronic condition.         CBC & Differential [[id number]]  (Abnormal) Collected: 02/05/24 0925    Specimen: Blood Updated: 02/05/24 0936    Narrative:      The following orders were created for panel order CBC & Differential.  Procedure                                Abnormality         Status                     ---------                               -----------         ------                     CBC Auto Differential[[id number]]        Abnormal            Final result                 Please view results for these tests on the individual orders.    Comprehensive Metabolic Panel [[id number]]  (Abnormal) Collected: 02/05/24 0925    Specimen: Blood Updated: 02/05/24 0956     Glucose 137 mg/dL      BUN 9 mg/dL      Creatinine 0.58 mg/dL      Sodium 138 mmol/L      Potassium 3.6 mmol/L      Chloride 105 mmol/L      CO2 22.2 mmol/L      Calcium 9.2 mg/dL      Total Protein 6.6 g/dL      Albumin 3.6 g/dL      ALT (SGPT) 15 U/L      AST (SGOT) 18 U/L      Alkaline Phosphatase 56 U/L      Total Bilirubin 0.5 mg/dL      Globulin 3.0 gm/dL      A/G Ratio 1.2 g/dL      BUN/Creatinine Ratio 15.5     Anion Gap 10.8 mmol/L      eGFR 118.2 mL/min/1.73     Narrative:      GFR Normal >60  Chronic Kidney Disease <60  Kidney Failure <15      Lipase [[id number]]  (Normal) Collected: 02/05/24 0925    Specimen: Blood Updated: 02/05/24 0956     Lipase 22 U/L     BNP [[id number]]  (Normal) Collected: 02/05/24 0925    Specimen: Blood Updated: 02/05/24 0954     proBNP 322.3 pg/mL     Narrative:      This assay is used as an aid in the diagnosis of individuals suspected of having heart failure. It can be used as an aid in the diagnosis of acute decompensated heart failure (ADHF) in patients presenting with signs and symptoms of ADHF to the emergency department (ED). In addition, NT-proBNP of <300 pg/mL indicates ADHF is not likely.    Age Range Result Interpretation  NT-proBNP Concentration (pg/mL:      <50             Positive            >450                   Gray                 300-450                    Negative             <300    50-75           Positive            >900                  Gray                300-900                  Negative            <300      >75             Positive             ">1800                  Garcia                300-1800                  Negative            <300    Magnesium [[id number]]  (Normal) Collected: 02/05/24 0925    Specimen: Blood Updated: 02/05/24 0956     Magnesium 1.6 mg/dL     CBC Auto Differential [[id number]]  (Abnormal) Collected: 02/05/24 0925    Specimen: Blood Updated: 02/05/24 0936     WBC 5.61 10*3/mm3      RBC 4.13 10*6/mm3      Hemoglobin 11.4 g/dL      Hematocrit 35.8 %      MCV 86.7 fL      MCH 27.6 pg      MCHC 31.8 g/dL      RDW 11.9 %      RDW-SD 37.9 fl      MPV 9.3 fL      Platelets 315 10*3/mm3      Neutrophil % 43.6 %      Lymphocyte % 45.3 %      Monocyte % 8.9 %      Eosinophil % 1.8 %      Basophil % 0.2 %      Immature Grans % 0.2 %      Neutrophils, Absolute 2.45 10*3/mm3      Lymphocytes, Absolute 2.54 10*3/mm3      Monocytes, Absolute 0.50 10*3/mm3      Eosinophils, Absolute 0.10 10*3/mm3      Basophils, Absolute 0.01 10*3/mm3      Immature Grans, Absolute 0.01 10*3/mm3      nRBC 0.0 /100 WBC     D-dimer, Quantitative [[id number]]  (Normal) Collected: 02/05/24 0925    Specimen: Blood Updated: 02/05/24 1006     D-Dimer, Quantitative 0.31 MCGFEU/mL     Narrative:      According to the assay 's published package insert, a normal (<0.50 MCGFEU/mL) D-dimer result in conjunction with a non-high clinical probability assessment, excludes deep vein thrombosis (DVT) and pulmonary embolism (PE) with high sensitivity.    D-dimer values increase with age and this can make VTE exclusion of an older population difficult. To address this, the [employer] College [employer], based on best available evidence and recent guidelines, recommends that clinicians use age-adjusted D-dimer thresholds in patients greater than 50 years of age with: a) a low probability of PE who do not meet all Pulmonary Embolism Rule Out Criteria, or b) in those with intermediate probability of PE.   The formula for an age-adjusted D-dimer cut-off is \"age/100\".  For example, a " 60 year old patient would have an age-adjusted cut-off of 0.60 MCGFEU/mL and an 80 year old 0.80 MCGFEU/mL.    COVID-19, FLU A/B, RSV PCR 1 HR TAT - Swab, Nasopharynx [[id number]]  (Normal) Collected: 02/05/24 1003    Specimen: Swab from Nasopharynx Updated: 02/05/24 1102     COVID19 Not Detected     Influenza A PCR Not Detected     Influenza B PCR Not Detected     RSV, PCR Not Detected    Narrative:      Fact sheet for providers: https://www.fda.gov/media/896077/download    Fact sheet for patients: https://www.fda.gov/media/808033/download    Test performed by PCR.    High Sensitivity Troponin T 2Hr [[id number]]  (Normal) Collected: 02/05/24 1136    Specimen: Blood Updated: 02/05/24 1158     HS Troponin T 8 ng/L      Troponin T Delta 2 ng/L     Narrative:      High Sensitive Troponin T Reference Range:  <14.0 ng/L- Negative Female for AMI  <22.0 ng/L- Negative Male for AMI  >=14 - Abnormal Female indicating possible myocardial injury.  >=22 - Abnormal Male indicating possible myocardial injury.   Clinicians would have to utilize clinical acumen, EKG, Troponin, and serial changes to determine if it is an Acute Myocardial Infarction or myocardial injury due to an underlying chronic condition.                  Imaging:    XR Chest 1 View    Result Date: 2/5/2024  PROCEDURE: XR CHEST 1 VW  COMPARISON: [facility], CR, CHEST PA/AP & LAT 2V, 3/17/2017, 11:15.  INDICATIONS: Chest Pain Triage Protocol  FINDINGS:  The heart is enlarged.  The pulmonary vascular markings are normal.  The left hemidiaphragm is slightly elevated with questionable basilar subsegmental atelectasis.  The remaining lungs and pleural spaces are clear.  There is mild thoracic spondylosis.        1. Cardiomegaly. 2. Elevated left hemidiaphragm with questionable basilar subsegmental atelectasis.       ZORA KEN MD       Electronically Signed and Approved By: ZORA KEN MD on 2/05/2024 at 9:39                Differential Diagnosis and Discussion:    Chest  Pain:  Based on the patient's signs and symptoms, I considered aortic dissection, myocardial infaction, pulmonary embolism, cardiac tamponade, pericarditis, pneumothorax, musculoskeletal chest pain and other differential diagnosis as an etiology of the patient's chest pain.     All labs were reviewed and interpreted by me.  All X-rays impressions were independently interpreted by me.  EKG was interpreted by me.    MDM     Patient is a 38-year-old who presents with complaints of intermittent chest pain as well as headache, body aches, just does not feel well.  Labs and imaging here did not show any acute findings.  X-ray does show some atelectasis and a little bit of cardiomegaly but the patient does not appear to be in heart failure and currently chest pain-free.  Has 2 negative sets of troponins.  D-dimer is negative so low concern for PE.  Patient does not have rib pain back pain going to her back so unlikely this is a dissection.  Blood pressure is slightly elevated but not significantly elevated.  Patient does have slightly elevated heart rate which has improved with fluids.  She is otherwise well-appearing at this time.  Okay for outpatient follow-up.  Will DC and have patient follow-up as an outpatient.          Patient Care Considerations:          Consultants/Shared Management Plan:    None    Social Determinants of Health:    Patient is independent, reliable, and has access to care.       Disposition and Care Coordination:    Discharged: The patient is suitable and stable for discharge with no need for consideration of admission.    I have explained the patient´s condition, diagnoses and treatment plan based on the information available to me at this time. I have answered questions and addressed any concerns. The patient has a good  understanding of the patient´s diagnosis, condition, and treatment plan as can be expected at this point. The vital signs have been stable. The patient´s condition is stable and  appropriate for discharge from the emergency department.      The patient will pursue further outpatient evaluation with the primary care physician or other designated or consulting physician as outlined in the discharge instructions. They are agreeable to this plan of care and follow-up instructions have been explained in detail. The patient has received these instructions in written format and have expressed an understanding of the discharge instructions. The patient is aware that any significant change in condition or worsening of symptoms should prompt an immediate return to this or the closest emergency department or call to 911.      Final diagnoses:   Atypical chest pain        ED Disposition       ED Disposition   Discharge    Condition   Stable    Comment   --               This medical record created using voice recognition software.             Alan Barnett MD  02/05/24 7532

## 2024-02-05 NOTE — Clinical Note
[facility] EMERGENCY ROOM  [address]  [place] [address]  Phone: [phone]    Renny Cole was seen and treated in our emergency department on 2/5/2024.  She may return to work on 02/06/2024.         Thank you for choosing [facility].    Yris Aponte, RN

## 2024-02-07 ENCOUNTER — APPOINTMENT (OUTPATIENT)
Dept: CT IMAGING | Facility: HOSPITAL | Age: 39
End: 2024-02-07
Payer: COMMERCIAL

## 2024-02-07 ENCOUNTER — TELEPHONE (OUTPATIENT)
Dept: FAMILY MEDICINE CLINIC | Facility: CLINIC | Age: 39
End: 2024-02-07
Payer: COMMERCIAL

## 2024-02-07 ENCOUNTER — APPOINTMENT (OUTPATIENT)
Dept: GENERAL RADIOLOGY | Facility: HOSPITAL | Age: 39
End: 2024-02-07
Payer: COMMERCIAL

## 2024-02-07 ENCOUNTER — HOSPITAL ENCOUNTER (EMERGENCY)
Facility: HOSPITAL | Age: 39
Discharge: HOME | End: 2024-02-07
Attending: EMERGENCY MEDICINE | Admitting: EMERGENCY MEDICINE
Payer: COMMERCIAL

## 2024-02-07 ENCOUNTER — OFFICE VISIT (OUTPATIENT)
Dept: FAMILY MEDICINE CLINIC | Facility: CLINIC | Age: 39
End: 2024-02-07
Payer: COMMERCIAL

## 2024-02-07 VITALS
BODY MASS INDEX: 32.91 KG/M2 | RESPIRATION RATE: 14 BRPM | DIASTOLIC BLOOD PRESSURE: 94 MMHG | TEMPERATURE: 98.1 F | WEIGHT: 209.7 LBS | SYSTOLIC BLOOD PRESSURE: 182 MMHG | HEART RATE: 110 BPM | OXYGEN SATURATION: 99 % | HEIGHT: 67 IN

## 2024-02-07 VITALS
HEIGHT: 67 IN | OXYGEN SATURATION: 97 % | TEMPERATURE: 98.8 F | DIASTOLIC BLOOD PRESSURE: 81 MMHG | HEART RATE: 103 BPM | BODY MASS INDEX: 33.74 KG/M2 | WEIGHT: 215 LBS | SYSTOLIC BLOOD PRESSURE: 168 MMHG | RESPIRATION RATE: 18 BRPM

## 2024-02-07 DIAGNOSIS — N63.0 BREAST NODULE: Primary | ICD-10-CM

## 2024-02-07 DIAGNOSIS — R59.0 MEDIASTINAL LYMPHADENOPATHY: ICD-10-CM

## 2024-02-07 DIAGNOSIS — I10 PRIMARY HYPERTENSION: Primary | ICD-10-CM

## 2024-02-07 DIAGNOSIS — Z20.2 STD EXPOSURE: ICD-10-CM

## 2024-02-07 LAB
ALBUMIN SERPL-MCNC: 4.3 G/DL (ref 3.5–5.2)
ALBUMIN/GLOB SERPL: 1.3 G/DL
ALP SERPL-CCNC: 66 U/L (ref 39–117)
ALT SERPL W P-5'-P-CCNC: 20 U/L (ref 1–33)
ANION GAP SERPL CALCULATED.3IONS-SCNC: 11.2 MMOL/L (ref 5–15)
AST SERPL-CCNC: 24 U/L (ref 1–32)
BASOPHILS # BLD AUTO: 0.02 10*3/MM3 (ref 0–0.2)
BASOPHILS NFR BLD AUTO: 0.3 % (ref 0–1.5)
BILIRUB SERPL-MCNC: 0.4 MG/DL (ref 0–1.2)
BUN SERPL-MCNC: 10 MG/DL (ref 6–20)
BUN/CREAT SERPL: 18.9 (ref 7–25)
C TRACH RRNA CVX QL NAA+PROBE: NOT DETECTED
CALCIUM SPEC-SCNC: 9.9 MG/DL (ref 8.6–10.5)
CANDIDA SPECIES: NEGATIVE
CHLORIDE SERPL-SCNC: 102 MMOL/L (ref 98–107)
CO2 SERPL-SCNC: 23.8 MMOL/L (ref 22–29)
CREAT SERPL-MCNC: 0.53 MG/DL (ref 0.57–1)
DEPRECATED RDW RBC AUTO: 37.4 FL (ref 37–54)
EGFRCR SERPLBLD CKD-EPI 2021: 120.8 ML/MIN/1.73
EOSINOPHIL # BLD AUTO: 0.14 10*3/MM3 (ref 0–0.4)
EOSINOPHIL NFR BLD AUTO: 2.3 % (ref 0.3–6.2)
ERYTHROCYTE [DISTWIDTH] IN BLOOD BY AUTOMATED COUNT: 11.8 % (ref 12.3–15.4)
GARDNERELLA VAGINALIS: POSITIVE
GEN 5 2HR TROPONIN T REFLEX: 8 NG/L
GLOBULIN UR ELPH-MCNC: 3.4 GM/DL
GLUCOSE SERPL-MCNC: 104 MG/DL (ref 65–99)
HCT VFR BLD AUTO: 39.3 % (ref 34–46.6)
HGB BLD-MCNC: 12.5 G/DL (ref 12–15.9)
HIV 1+2 AB+HIV1 P24 AG SERPL QL IA: NORMAL
HOLD SPECIMEN: NORMAL
HOLD SPECIMEN: NORMAL
IMM GRANULOCYTES # BLD AUTO: 0 10*3/MM3 (ref 0–0.05)
IMM GRANULOCYTES NFR BLD AUTO: 0 % (ref 0–0.5)
LIPASE SERPL-CCNC: 28 U/L (ref 13–60)
LYMPHOCYTES # BLD AUTO: 2.86 10*3/MM3 (ref 0.7–3.1)
LYMPHOCYTES NFR BLD AUTO: 46.2 % (ref 19.6–45.3)
MAGNESIUM SERPL-MCNC: 1.7 MG/DL (ref 1.6–2.6)
MCH RBC QN AUTO: 27.6 PG (ref 26.6–33)
MCHC RBC AUTO-ENTMCNC: 31.8 G/DL (ref 31.5–35.7)
MCV RBC AUTO: 86.8 FL (ref 79–97)
MONOCYTES # BLD AUTO: 0.44 10*3/MM3 (ref 0.1–0.9)
MONOCYTES NFR BLD AUTO: 7.1 % (ref 5–12)
N GONORRHOEA RRNA SPEC QL NAA+PROBE: NOT DETECTED
NEUTROPHILS NFR BLD AUTO: 2.73 10*3/MM3 (ref 1.7–7)
NEUTROPHILS NFR BLD AUTO: 44.1 % (ref 42.7–76)
NRBC BLD AUTO-RTO: 0 /100 WBC (ref 0–0.2)
NT-PROBNP SERPL-MCNC: 327.9 PG/ML (ref 0–450)
PLATELET # BLD AUTO: 348 10*3/MM3 (ref 140–450)
PMV BLD AUTO: 9.1 FL (ref 6–12)
POTASSIUM SERPL-SCNC: 3.8 MMOL/L (ref 3.5–5.2)
PROT SERPL-MCNC: 7.7 G/DL (ref 6–8.5)
QT INTERVAL: 334 MS
QT INTERVAL: 360 MS
QTC INTERVAL: 442 MS
QTC INTERVAL: 455 MS
RBC # BLD AUTO: 4.53 10*6/MM3 (ref 3.77–5.28)
SODIUM SERPL-SCNC: 137 MMOL/L (ref 136–145)
T VAGINALIS DNA VAG QL PROBE+SIG AMP: NEGATIVE
TROPONIN T DELTA: -3 NG/L
TROPONIN T SERPL HS-MCNC: 11 NG/L
WBC NRBC COR # BLD AUTO: 6.19 10*3/MM3 (ref 3.4–10.8)
WHOLE BLOOD HOLD COAG: NORMAL
WHOLE BLOOD HOLD SPECIMEN: NORMAL

## 2024-02-07 PROCEDURE — 93005 ELECTROCARDIOGRAM TRACING: CPT | Performed by: EMERGENCY MEDICINE

## 2024-02-07 PROCEDURE — 93010 ELECTROCARDIOGRAM REPORT: CPT | Performed by: SPECIALIST

## 2024-02-07 PROCEDURE — 99285 EMERGENCY DEPT VISIT HI MDM: CPT

## 2024-02-07 PROCEDURE — 87480 CANDIDA DNA DIR PROBE: CPT

## 2024-02-07 PROCEDURE — 84484 ASSAY OF TROPONIN QUANT: CPT | Performed by: EMERGENCY MEDICINE

## 2024-02-07 PROCEDURE — 80053 COMPREHEN METABOLIC PANEL: CPT | Performed by: EMERGENCY MEDICINE

## 2024-02-07 PROCEDURE — 71045 X-RAY EXAM CHEST 1 VIEW: CPT

## 2024-02-07 PROCEDURE — 85025 COMPLETE CBC W/AUTO DIFF WBC: CPT

## 2024-02-07 PROCEDURE — 87660 TRICHOMONAS VAGIN DIR PROBE: CPT

## 2024-02-07 PROCEDURE — 87522 HEPATITIS C REVRS TRNSCRPJ: CPT

## 2024-02-07 PROCEDURE — 87510 GARDNER VAG DNA DIR PROBE: CPT

## 2024-02-07 PROCEDURE — 86695 HERPES SIMPLEX TYPE 1 TEST: CPT

## 2024-02-07 PROCEDURE — 83735 ASSAY OF MAGNESIUM: CPT | Performed by: EMERGENCY MEDICINE

## 2024-02-07 PROCEDURE — 25510000001 IOPAMIDOL PER 1 ML: Performed by: EMERGENCY MEDICINE

## 2024-02-07 PROCEDURE — 86696 HERPES SIMPLEX TYPE 2 TEST: CPT

## 2024-02-07 PROCEDURE — 83880 ASSAY OF NATRIURETIC PEPTIDE: CPT | Performed by: EMERGENCY MEDICINE

## 2024-02-07 PROCEDURE — 71260 CT THORAX DX C+: CPT

## 2024-02-07 PROCEDURE — 86592 SYPHILIS TEST NON-TREP QUAL: CPT

## 2024-02-07 PROCEDURE — 87491 CHLMYD TRACH DNA AMP PROBE: CPT

## 2024-02-07 PROCEDURE — 93005 ELECTROCARDIOGRAM TRACING: CPT

## 2024-02-07 PROCEDURE — 83690 ASSAY OF LIPASE: CPT | Performed by: EMERGENCY MEDICINE

## 2024-02-07 PROCEDURE — 87591 N.GONORRHOEAE DNA AMP PROB: CPT

## 2024-02-07 PROCEDURE — G0432 EIA HIV-1/HIV-2 SCREEN: HCPCS

## 2024-02-07 PROCEDURE — 36415 COLL VENOUS BLD VENIPUNCTURE: CPT

## 2024-02-07 RX ORDER — SODIUM CHLORIDE 0.9 % (FLUSH) 0.9 %
10 SYRINGE (ML) INJECTION AS NEEDED
Status: DISCONTINUED | OUTPATIENT
Start: 2024-02-07 | End: 2024-02-07 | Stop reason: HOSPADM

## 2024-02-07 RX ORDER — ASPIRIN 81 MG/1
324 TABLET, CHEWABLE ORAL ONCE
Status: DISCONTINUED | OUTPATIENT
Start: 2024-02-07 | End: 2024-02-07 | Stop reason: HOSPADM

## 2024-02-07 RX ORDER — AMLODIPINE BESYLATE 5 MG/1
5 TABLET ORAL DAILY
Qty: 30 TABLET | Refills: 0 | Status: SHIPPED | OUTPATIENT
Start: 2024-02-07 | End: 2024-02-08 | Stop reason: SDUPTHER

## 2024-02-07 RX ADMIN — IOPAMIDOL 100 ML: 755 INJECTION, SOLUTION INTRAVENOUS at 10:48

## 2024-02-07 NOTE — PROGRESS NOTES
"Chief Complaint  Exposure to STD    Subjective        Renny Cole presents to [facility]  History of Present Illness    The patient is a 38-year-old female who presents for evaluation of multiple medical concerns.    Her blood pressure has been elevated. The patient states that she went to the emergency room today because of her chest and elevated blood pressure. Her blood pressure and heart rate were elevated. She is currently on hydrochlorothiazide. She has a blood pressure cuff at home. She checks her blood pressure once or twice a week.  In office blood pressure today was 182/94.  We discussed starting on a different blood pressure medicine in conjunction with the hydrochlorothiazide 25 mg daily    Patient does state that she wants to be checked for STD exposure.        Objective   Vital Signs:  BP (!) 182/94   Pulse 110   Temp 98.1 °F (36.7 °C)   Resp 14   Ht 170.2 cm (67\")   Wt 95.1 kg (209 lb 11.2 oz)   SpO2 99%   BMI 32.84 kg/m²   Estimated body mass index is 32.84 kg/m² as calculated from the following:    Height as of this encounter: 170.2 cm (67\").    Weight as of this encounter: 95.1 kg (209 lb 11.2 oz).               Physical Exam   Result Review :                     Assessment and Plan     Diagnoses and all orders for this visit:    1. Primary hypertension (Primary)  Comments:  Have started patient on amlodipine.  We will follow-up in 30 days.  Orders:  -     Discontinue: amLODIPine (NORVASC) 5 MG tablet; Take 1 tablet by mouth Daily for 30 days.  Dispense: 30 tablet; Refill: 0    2. STD exposure  Comments:  Patient will be tested for STIs on today's visit.  We will call results.  Orders:  -     Hepatitis C RNA, quantitative, PCR (graph); Future  -     HIV-1/O/2 ANTIGEN/ANTIBODY, 4TH GENERATION; Future  -     HSV 1 and 2-Specific Ab, IgG; Future  -     RPR; Future  -     Chlamydia trachomatis, Neisseria gonorrhoeae, PCR - , Urine, Clean Catch; Future  -   "   Gardnerella vaginalis, Trichomonas vaginalis, Candida albicans, DNA - Swab, Vagina  -     Hepatitis C RNA, quantitative, PCR (graph)  -     HIV-1/O/2 ANTIGEN/ANTIBODY, 4TH GENERATION  -     HSV 1 and 2-Specific Ab, IgG  -     RPR  -     Chlamydia trachomatis, Neisseria gonorrhoeae, PCR - , Cervix             Follow Up     Return in about 4 weeks (around 3/6/2024) for follow up with her PCP.  Patient was given instructions and counseling regarding her condition or for health maintenance advice. Please see specific information pulled into the AVS if appropriate.       Transcribed from ambient dictation for LOYDA Hansen by Norbert Giordano.  02/07/24   16:41 EST    Patient or patient representative verbalized consent to the visit recording.  I have personally performed the services described in this document as transcribed by the above individual, and it is both accurate and complete.

## 2024-02-07 NOTE — ED PROVIDER NOTES
Time: 6:18 PM EST  Date of encounter:  2/7/2024  Independent Historian/Clinical History and Information was obtained by:   Patient    History is limited by: N/A    Chief Complaint: Chest pain      History of Present Illness:  Patient is a 38 y.o. year old female who presents to the emergency department for evaluation of chest pain.  Patient reports she was seen recently for chest pain and reports the pain is not improved.  She does report pain is worse with deep inspiration at this time.    HPI    Patient Care Team  Primary Care Provider: Pippa Yeboah APRN    Past Medical History:     No Known Allergies  Past Medical History:   Diagnosis Date    Asthma     Asthma 03/14/2017    Depression     Essential hypertension 03/14/2017    Hypertension     PCOS (polycystic ovarian syndrome)     STD (female)     GENITAL HERPES; TRICHOMONIASIS     History reviewed. No pertinent surgical history.  Family History   Problem Relation Age of Onset    Arthritis Mother     Asthma Mother     Hypertension Mother     Arthritis Father     Asthma Father     Hypertension Father     Cancer Maternal Grandmother         unknown    Stroke Neg Hx     Diabetes Neg Hx        Home Medications:  Prior to Admission medications    Medication Sig Start Date End Date Taking? Authorizing Provider   acyclovir (ZOVIRAX) 400 MG tablet TAKE 1 TABLET BY MOUTH DAILY. NO MORE THAN 5 DOSES A DAY 1/30/24   Pippa Yeboah APRN   amLODIPine (NORVASC) 5 MG tablet Take 1 tablet by mouth Daily for 30 days. 2/7/24 3/8/24  Ashley Deutsch APRN   hydroCHLOROthiazide (HYDRODIURIL) 25 MG tablet TAKE 1 TABLET BY MOUTH DAILY 2/7/22   Pippa Yeboah APRN   HYDROcodone-acetaminophen (Norco) 5-325 MG per tablet Take 1 tablet by mouth Every 6 (Six) Hours As Needed for Severe Pain. 12/13/23   Pippa Yeboah APRN   indomethacin (INDOCIN) 50 MG capsule Take 1 capsule by mouth 3 (Three) Times a Day As Needed for Mild Pain. 11/27/23   Pippa Yeboah  "[address]        Social History:   Social History     Tobacco Use    Smoking status: Never    Smokeless tobacco: Never   Vaping Use    Vaping Use: Never used   Substance Use Topics    Alcohol use: Never    Drug use: Never         Review of Systems:  Review of Systems   Constitutional:  Negative for chills and fever.   HENT:  Negative for congestion, rhinorrhea and sore throat.    Eyes:  Negative for pain and visual disturbance.   Respiratory:  Negative for apnea, cough, chest tightness and shortness of breath.    Cardiovascular:  Positive for chest pain. Negative for palpitations.   Gastrointestinal:  Negative for abdominal pain, diarrhea, nausea and vomiting.   Genitourinary:  Negative for difficulty urinating and dysuria.   Musculoskeletal:  Negative for joint swelling and myalgias.   Skin:  Negative for color change.   Neurological:  Negative for seizures and headaches.   Psychiatric/Behavioral: Negative.     All other systems reviewed and are negative.       Physical Exam:  /81 (BP Location: Left arm, Patient Position: Sitting)   Pulse 103   Temp 98.8 °F (37.1 °C) (Oral)   Resp 18   Ht 170.2 cm (67\")   Wt 97.5 kg (215 lb)   SpO2 97%   BMI 33.67 kg/m²     Physical Exam  Vitals and nursing note reviewed.   Constitutional:       General: She is not in acute distress.     Appearance: Normal appearance. She is not toxic-appearing.   HENT:      Head: Normocephalic and atraumatic.      Jaw: There is normal jaw occlusion.   Eyes:      General: Lids are normal.      Extraocular Movements: Extraocular movements intact.      Conjunctiva/sclera: Conjunctivae normal.      Pupils: Pupils are equal, round, and reactive to light.   Cardiovascular:      Rate and Rhythm: Normal rate and regular rhythm.      Pulses: Normal pulses.      Heart sounds: Normal heart sounds.   Pulmonary:      Effort: Pulmonary effort is normal. No respiratory distress.      Breath sounds: Normal breath sounds. No wheezing or rhonchi. "   Abdominal:      General: Abdomen is flat.      Palpations: Abdomen is soft.      Tenderness: There is no abdominal tenderness. There is no guarding or rebound.   Musculoskeletal:         General: Normal range of motion.      Cervical back: Normal range of motion and neck supple.      Right lower leg: No edema.      Left lower leg: No edema.   Skin:     General: Skin is warm and dry.   Neurological:      Mental Status: She is alert and oriented to person, place, and time. Mental status is at baseline.   Psychiatric:         Mood and Affect: Mood normal.                  Procedures:  Procedures      Medical Decision Making:      Comorbidities that affect care:    Hypertension    External Notes reviewed:    None      The following orders were placed and all results were independently analyzed by me:  Orders Placed This Encounter   Procedures    XR Chest 1 View    CT Chest With Contrast Diagnostic    [place] Draw    High Sensitivity Troponin T    Comprehensive Metabolic Panel    Lipase    BNP    Magnesium    CBC Auto Differential    High Sensitivity Troponin T 2Hr    Undress & Gown    Continuous Pulse Oximetry    ECG 12 Lead ED Triage Standing Order; Chest Pain    CBC & Differential    Green Top (Gel)    Lavender Top    Gold Top - SST    Light Blue Top       Medications Given in the Emergency Department:  Medications   iopamidol (ISOVUE-370) 76 % injection 100 mL (100 mL Intravenous Given 2/7/24 1048)        ED Course:         Labs:    Lab Results (last 24 hours)       Procedure Component Value Units Date/Time    High Sensitivity Troponin T [[id number]]  (Normal) Collected: 02/07/24 0940    Specimen: Blood from Arm, Left Updated: 02/07/24 1016     HS Troponin T 11 ng/L     Narrative:      High Sensitive Troponin T Reference Range:  <14.0 ng/L- Negative Female for AMI  <22.0 ng/L- Negative Male for AMI  >=14 - Abnormal Female indicating possible myocardial injury.  >=22 - Abnormal Male indicating possible myocardial  injury.   Clinicians would have to utilize clinical acumen, EKG, Troponin, and serial changes to determine if it is an Acute Myocardial Infarction or myocardial injury due to an underlying chronic condition.         CBC & Differential [[id number]]  (Abnormal) Collected: 02/07/24 0940    Specimen: Blood from Arm, Left Updated: 02/07/24 0955    Narrative:      The following orders were created for panel order CBC & Differential.  Procedure                               Abnormality         Status                     ---------                               -----------         ------                     CBC Auto Differential[[id number]]        Abnormal            Final result                 Please view results for these tests on the individual orders.    Comprehensive Metabolic Panel [[id number]]  (Abnormal) Collected: 02/07/24 0940    Specimen: Blood from Arm, Left Updated: 02/07/24 1016     Glucose 104 mg/dL      BUN 10 mg/dL      Creatinine 0.53 mg/dL      Sodium 137 mmol/L      Potassium 3.8 mmol/L      Chloride 102 mmol/L      CO2 23.8 mmol/L      Calcium 9.9 mg/dL      Total Protein 7.7 g/dL      Albumin 4.3 g/dL      ALT (SGPT) 20 U/L      AST (SGOT) 24 U/L      Alkaline Phosphatase 66 U/L      Total Bilirubin 0.4 mg/dL      Globulin 3.4 gm/dL      A/G Ratio 1.3 g/dL      BUN/Creatinine Ratio 18.9     Anion Gap 11.2 mmol/L      eGFR 120.8 mL/min/1.73     Narrative:      GFR Normal >60  Chronic Kidney Disease <60  Kidney Failure <15      Lipase [[id number]]  (Normal) Collected: 02/07/24 0940    Specimen: Blood from Arm, Left Updated: 02/07/24 1016     Lipase 28 U/L     BNP [[id number]]  (Normal) Collected: 02/07/24 0940    Specimen: Blood from Arm, Left Updated: 02/07/24 1013     proBNP 327.9 pg/mL     Narrative:      This assay is used as an aid in the diagnosis of individuals suspected of having heart failure. It can be used as an aid in the diagnosis of acute decompensated heart failure (ADHF) in patients presenting  with signs and symptoms of ADHF to the emergency department (ED). In addition, NT-proBNP of <300 pg/mL indicates ADHF is not likely.    Age Range Result Interpretation  NT-proBNP Concentration (pg/mL:      <50             Positive            >450                   Gray                 300-450                    Negative             <300    50-75           Positive            >900                  Gray                300-900                  Negative            <300      >75             Positive            >1800                  Gray                300-1800                  Negative            <300    Magnesium [[id number]]  (Normal) Collected: 02/07/24 0940    Specimen: Blood from Arm, Left Updated: 02/07/24 1016     Magnesium 1.7 mg/dL     CBC Auto Differential [[id number]]  (Abnormal) Collected: 02/07/24 0940    Specimen: Blood from Arm, Left Updated: 02/07/24 0955     WBC 6.19 10*3/mm3      RBC 4.53 10*6/mm3      Hemoglobin 12.5 g/dL      Hematocrit 39.3 %      MCV 86.8 fL      MCH 27.6 pg      MCHC 31.8 g/dL      RDW 11.8 %      RDW-SD 37.4 fl      MPV 9.1 fL      Platelets 348 10*3/mm3      Neutrophil % 44.1 %      Lymphocyte % 46.2 %      Monocyte % 7.1 %      Eosinophil % 2.3 %      Basophil % 0.3 %      Immature Grans % 0.0 %      Neutrophils, Absolute 2.73 10*3/mm3      Lymphocytes, Absolute 2.86 10*3/mm3      Monocytes, Absolute 0.44 10*3/mm3      Eosinophils, Absolute 0.14 10*3/mm3      Basophils, Absolute 0.02 10*3/mm3      Immature Grans, Absolute 0.00 10*3/mm3      nRBC 0.0 /100 WBC     High Sensitivity Troponin T 2Hr [[id number]]  (Normal) Collected: 02/07/24 1200    Specimen: Blood Updated: 02/07/24 1225     HS Troponin T 8 ng/L      Troponin T Delta -3 ng/L     Narrative:      High Sensitive Troponin T Reference Range:  <14.0 ng/L- Negative Female for AMI  <22.0 ng/L- Negative Male for AMI  >=14 - Abnormal Female indicating possible myocardial injury.  >=22 - Abnormal Male indicating possible  myocardial injury.   Clinicians would have to utilize clinical acumen, EKG, Troponin, and serial changes to determine if it is an Acute Myocardial Infarction or myocardial injury due to an underlying chronic condition.         Gardnerella vaginalis, Trichomonas vaginalis, Candida albicans, DNA - Swab, Vagina [[id number]] Collected: 02/07/24 1628    Specimen: Swab from Vagina Updated: 02/07/24 1629    Hepatitis C RNA, quantitative, PCR (graph) [[id number]] Collected: 02/07/24 1628    Specimen: Blood from Arm, Left Updated: 02/07/24 1629    HIV-1/O/2 ANTIGEN/ANTIBODY, 4TH GENERATION [[id number]] Collected: 02/07/24 1628    Specimen: Blood from Arm, Left Updated: 02/07/24 1629    HSV 1 and 2-Specific Ab, IgG [[id number]] Collected: 02/07/24 1628    Specimen: Blood from Arm, Left Updated: 02/07/24 1629    RPR [[id number]] Collected: 02/07/24 1628    Specimen: Blood from Arm, Left Updated: 02/07/24 1629    Chlamydia trachomatis, Neisseria gonorrhoeae, PCR - , Cervix [[id number]] Collected: 02/07/24 1628    Specimen: Cervix Updated: 02/07/24 1629             Imaging:    CT Chest With Contrast Diagnostic    Result Date: 2/7/2024  PROCEDURE: CT CHEST W CONTRAST DIAGNOSTIC  COMPARISON:  None INDICATIONS: chest pain, shortness of breath  TECHNIQUE: After obtaining the patient's consent, CT images were obtained with non-ionic intravenous contrast material.   PROTOCOL:   Pulmonary embolism imaging protocol performed    RADIATION:   DLP: 405.5 mGy*cm   Automated exposure control was utilized to minimize radiation dose. CONTRAST: 100 cc Isovue 370 I.V.  FINDINGS:  Pulmonary arteries:  Adequately opacified.  No emboli demonstrated.  Janay/mediastinum:  Homogeneous lobular soft tissue in the anterior mediastinum, measuring 6.5 cm transverse and 3.1 cm AP dimension superiorly on image 104. No fat or calcification within the mass.  Mildly prominent mediastinal and right hilar lymph nodes.  A high pretracheal lymph node on image 74 measures  1.7 x 1.3 cm.  Right hilar node measures 1.9 x 1.9 cm on image 123.  Thoracic aorta normal in caliber.  No coronary calcification.  No pericardial effusion  Lungs/pleura:  No acute infiltrate or edema.  No pulmonary nodule.  No pleural effusion  Upper abdomen:  3.3 cm mass in the medial segment of the left hepatic lobe.  The mass demonstrates thin peripheral enhancement as well as some heterogeneous internal enhancement.  There is also relatively low density within the mass that approaches but is not definitely fat density.  There are no internal calcifications.  Bones/soft tissues:  1.6 cm right breast nodule.  Mildly prominent bilateral axillary lymph nodes.  No suspicious bone lesion          1. No evidence of pulmonary embolism  2. No acute cardiopulmonary process demonstrated  3. Anterior mediastinal soft tissue lesion.  Differential possibilities include thymic hyperplasia, benign and malignant thymic lesions, and less likely lymphoma or metastatic disease  4. Mildly prominent mediastinal, right hilar, and axillary lymph nodes  5. 3.3 cm indeterminate liver mass.  Recommend characterization with liver MRI  6. 1.6 cm right breast nodule.  Recommend mammographic and ultrasound correlation       TEREZA GAY MD       Electronically Signed and Approved By: TEREZA GAY MD on 2/07/2024 at 11:48             XR Chest 1 View    Result Date: 2/7/2024  PROCEDURE: XR CHEST 1 VW  COMPARISON: [facility], , XR CHEST 1 VW, 2/05/2024, 9:38.  INDICATIONS: CHEST PAIN  FINDINGS:  The heart is enlarged.  There is no gregg consolidation or obvious pleural effusions.  Patient is slightly rotated.        1. Cardiomegaly       HARRIS VALENCIA MD       Electronically Signed and Approved By: HARRIS VALENCIA MD on 2/07/2024 at 10:46                Differential Diagnosis and Discussion:    Chest Pain:  Based on the patient's signs and symptoms, I considered aortic dissection, myocardial infaction, pulmonary embolism,  cardiac tamponade, pericarditis, pneumothorax, musculoskeletal chest pain and other differential diagnosis as an etiology of the patient's chest pain.     All labs were reviewed and interpreted by me.  All X-rays impressions were independently interpreted by me.  EKG was interpreted by me.  CT scan radiology impression was interpreted by me.    MDM  Number of Diagnoses or Management Options  Breast nodule  Mediastinal lymphadenopathy  Diagnosis management comments: In summary this is a 38-year-old female patient who presents to the emergency department for evaluation of chest pain.  She was recently seen and evaluated for chest pain as well.  CBC independently reviewed by me and shows no critical abnormalities.  CMP independently reviewed by me and shows no critical abnormalities.  High-sensitivity troponin unremarkable.  Chest x-ray independent reviewed and unremarkable for acute pathology.  CT of the chest was performed and reveals mediastinal lymphadenopathy along with multiple other incidental findings of which patient has been informed.  Very strict return to ER and follow-up instructions have been provided to the patient.                   Patient Care Considerations:    CONSULT: I considered consulting pulmonology, however this will be accomplished outpatient      Consultants/Shared Management Plan:    None    Social Determinants of Health:    Patient is independent, reliable, and has access to care.       Disposition and Care Coordination:    Discharged: I considered escalation of care by admitting this patient to the hospital, however the patient has improved and is suitable and  stable for discharge.    I have explained the patient´s condition, diagnoses and treatment plan based on the information available to me at this time. I have answered questions and addressed any concerns. The patient has a good  understanding of the patient´s diagnosis, condition, and treatment plan as can be expected at this  point. The vital signs have been stable. The patient´s condition is stable and appropriate for discharge from the emergency department.      The patient will pursue further outpatient evaluation with the primary care physician or other designated or consulting physician as outlined in the discharge instructions. They are agreeable to this plan of care and follow-up instructions have been explained in detail. The patient has received these instructions in written format and has expressed an understanding of the discharge instructions. The patient is aware that any significant change in condition or worsening of symptoms should prompt an immediate return to this or the closest emergency department or call to 911.  I have explained discharge medications and the need for follow up with the patient/caretakers. This was also printed in the discharge instructions. Patient was discharged with the following medications and follow up:      Medication List      No changes were made to your prescriptions during this visit.      Pippa Yeboah, APRN  [address]  [address]  [place] [address]  [phone]    In 2 days      Peng Houser, DO  [address]  [address]  [facility] [address]  [phone]    In 1 week         Final diagnoses:   Breast nodule   Mediastinal lymphadenopathy        ED Disposition       ED Disposition   Discharge    Condition   Stable    Comment   --               This medical record created using voice recognition software.             Giovanni Snyder MD  02/07/24 4925

## 2024-02-07 NOTE — Clinical Note
[facility] EMERGENCY ROOM  [address]  [place] [address]  Phone: [phone]    Renny Cole was seen and treated in our emergency department on 2/7/2024.  She may return to work on 02/12/2024.         Thank you for choosing [facility].    Giovanni Snyder MD

## 2024-02-08 DIAGNOSIS — I10 PRIMARY HYPERTENSION: ICD-10-CM

## 2024-02-08 LAB
QT INTERVAL: 324 MS
QT INTERVAL: 357 MS
QTC INTERVAL: 449 MS
QTC INTERVAL: 455 MS
RPR SER QL: NORMAL

## 2024-02-08 RX ORDER — AMLODIPINE BESYLATE 5 MG/1
5 TABLET ORAL DAILY
Qty: 90 TABLET | Refills: 0 | Status: SHIPPED | OUTPATIENT
Start: 2024-02-08 | End: 2024-05-08

## 2024-02-08 RX ORDER — METRONIDAZOLE 500 MG/1
500 TABLET ORAL 2 TIMES DAILY
Qty: 14 TABLET | Refills: 0 | Status: SHIPPED | OUTPATIENT
Start: 2024-02-08 | End: 2024-02-15

## 2024-02-09 ENCOUNTER — PATIENT OUTREACH (OUTPATIENT)
Dept: CASE MANAGEMENT | Facility: OTHER | Age: 39
End: 2024-02-09
Payer: COMMERCIAL

## 2024-02-09 LAB
HSV1 IGG SER IA-ACNC: <0.91 INDEX (ref 0–0.9)
HSV2 IGG SER IA-ACNC: 3.07 INDEX (ref 0–0.9)

## 2024-02-09 NOTE — OUTREACH NOTE
AMBULATORY CASE MANAGEMENT NOTE    Name and Relationship of Patient/Support Person: Renny Cole - Self  Self    Patient Outreach    Outgoing call to the patient for follow up related to ED visit and to offer case management services.  Introduced self and explained role and purpose of outreach.  She has follow up PCP appointment.  She denies needs at this time.  Discussed CCM services and she is not interested at this time.  She will outreach if she has needs and has ACM contact information.     Maribell HERNÁNDEZ  Ambulatory Case Management    2/9/2024, 13:54 EST

## 2024-02-10 LAB
HCV RNA SERPL NAA+PROBE-ACNC: NORMAL IU/ML
TEST INFORMATION: NORMAL

## 2024-02-12 NOTE — PROGRESS NOTES
"NEW PATIENT GYN Visit    Chief Complaint   Patient presents with    Infertility       HPI:   38 y.o. LMP: Patient's last menstrual period was 2024 (exact date).  Patient presents today to discuss fertility.  She states she has never been pregnant.  She states her and her  have been trying for proximately 4 years without protection.  She states her cycles are not regular.  She has not had a workup for infertility before.  She states her  has 2 children.  She states she is willing to do all measures for pregnancy including IVF.  She has previously been told that she has PCOS.    Social History     Substance and Sexual Activity   Sexual Activity Yes    Partners: Male    Birth control/protection: None, Natural family planning/Rhythm       History: PMHx, Meds, Allergies, PSHx, Social Hx, and POBHx all reviewed and updated.  Last Completed Pap Smear            PAP SMEAR (Every 3 Years) Next due on 2022  IgP, Aptima HPV    2021  IgP, Aptima HPV    2016  Outside Procedure: CHG CYTOPATH CERV/VAG THIN LAYER                    PHYSICAL EXAM:  /82   Pulse 88   Ht 170.2 cm (67\")   Wt 93.5 kg (206 lb 3.2 oz)   LMP 2024 (Exact Date)   BMI 32.30 kg/m²   General- NAD, alert and oriented, appropriate  Psych- Normal mood  Respiratory- No labored breathing  Abdomen- Soft, non distended, non tender  Extremities- No edema  Skin- No lesions, no rashes      ASSESSMENT AND PLAN:  Diagnoses and all orders for this visit:    1. Trying to get pregnant (Primary)  -     Antimullerian Hormone (AMH)  -     Follicle Stimulating Hormone  -     Estradiol  -     Prolactin  -     TSH  -     DHEA-Sulfate  -     Lipid Panel  -     Insulin, Random  -     17-Hydroxyprogesterone  -     US Pelvis Transvaginal Non OB; Future  -     Ambulatory Referral to Infertility    2. Abnormal uterine bleeding (AUB)    Advised patient that I will start the workup for irregular menses and " planning for pregnancy.  Will order lab work and pelvic ultrasound.  Also will put in a referral to [facility].  Patient is agreeable and expressed understanding.  TRACK MENSES, RTO if <q21 days (frequent) or >q3mo (infrequent IF not on hormonal BC), >7d long, heavy, or painful.        Follow Up:  Return in about 4 weeks (around 3/19/2024) for Recheck.        Shiloh Mercado DO  02/20/2024    [facility]  [address] [place] [address]  Dept: [phone]  Dept Fax: [phone]  Loc: [phone]  Loc Fax: [phone]

## 2024-02-17 ENCOUNTER — HOSPITAL ENCOUNTER (OUTPATIENT)
Dept: MAMMOGRAPHY | Facility: HOSPITAL | Age: 39
Discharge: HOME | End: 2024-02-17
Admitting: SURGERY
Payer: COMMERCIAL

## 2024-02-17 ENCOUNTER — APPOINTMENT (OUTPATIENT)
Dept: MRI IMAGING | Facility: HOSPITAL | Age: 39
End: 2024-02-17
Payer: COMMERCIAL

## 2024-02-17 DIAGNOSIS — Z12.31 ENCOUNTER FOR SCREENING MAMMOGRAM FOR MALIGNANT NEOPLASM OF BREAST: ICD-10-CM

## 2024-02-17 PROCEDURE — 77063 BREAST TOMOSYNTHESIS BI: CPT

## 2024-02-17 PROCEDURE — 77067 SCR MAMMO BI INCL CAD: CPT

## 2024-02-20 ENCOUNTER — OFFICE VISIT (OUTPATIENT)
Dept: OBSTETRICS AND GYNECOLOGY | Facility: CLINIC | Age: 39
End: 2024-02-20
Payer: COMMERCIAL

## 2024-02-20 VITALS
WEIGHT: 206.2 LBS | DIASTOLIC BLOOD PRESSURE: 82 MMHG | HEIGHT: 67 IN | SYSTOLIC BLOOD PRESSURE: 142 MMHG | BODY MASS INDEX: 32.36 KG/M2 | HEART RATE: 88 BPM

## 2024-02-20 DIAGNOSIS — Z78.9 TRYING TO GET PREGNANT: Primary | ICD-10-CM

## 2024-02-20 DIAGNOSIS — N93.9 ABNORMAL UTERINE BLEEDING (AUB): ICD-10-CM

## 2024-02-20 LAB
CHOLEST SERPL-MCNC: 60 MG/DL (ref 0–200)
ESTRADIOL SERPL HS-MCNC: 93.8 PG/ML
FSH SERPL-ACNC: 7.41 MIU/ML
HDLC SERPL-MCNC: 24 MG/DL (ref 40–60)
LDLC SERPL CALC-MCNC: 13 MG/DL (ref 0–100)
LDLC/HDLC SERPL: 0.44 {RATIO}
PROLACTIN SERPL-MCNC: 14.2 NG/ML (ref 4.79–23.3)
TRIGL SERPL-MCNC: 127 MG/DL (ref 0–150)
TSH SERPL DL<=0.05 MIU/L-ACNC: <0.005 UIU/ML (ref 0.27–4.2)
VLDLC SERPL-MCNC: 23 MG/DL (ref 5–40)

## 2024-02-20 PROCEDURE — 83001 ASSAY OF GONADOTROPIN (FSH): CPT | Performed by: STUDENT IN AN ORGANIZED HEALTH CARE EDUCATION/TRAINING PROGRAM

## 2024-02-20 PROCEDURE — 84146 ASSAY OF PROLACTIN: CPT | Performed by: STUDENT IN AN ORGANIZED HEALTH CARE EDUCATION/TRAINING PROGRAM

## 2024-02-20 PROCEDURE — 82670 ASSAY OF TOTAL ESTRADIOL: CPT | Performed by: STUDENT IN AN ORGANIZED HEALTH CARE EDUCATION/TRAINING PROGRAM

## 2024-02-20 PROCEDURE — 84443 ASSAY THYROID STIM HORMONE: CPT | Performed by: STUDENT IN AN ORGANIZED HEALTH CARE EDUCATION/TRAINING PROGRAM

## 2024-02-20 PROCEDURE — 80061 LIPID PANEL: CPT | Performed by: STUDENT IN AN ORGANIZED HEALTH CARE EDUCATION/TRAINING PROGRAM

## 2024-02-21 ENCOUNTER — PATIENT ROUNDING (BHMG ONLY) (OUTPATIENT)
Dept: OBSTETRICS AND GYNECOLOGY | Facility: CLINIC | Age: 39
End: 2024-02-21
Payer: COMMERCIAL

## 2024-02-21 NOTE — PROGRESS NOTES
A My-Chart message has been sent to the patient for PATIENT ROUNDING with [facility].

## 2024-02-22 LAB — SPECIMEN STATUS: NORMAL

## 2024-02-23 LAB
DHEA-S SERPL-MCNC: 144 UG/DL (ref 57.3–279.2)
INSULIN SERPL-ACNC: 44 UIU/ML

## 2024-02-25 LAB — MIS SERPL-MCNC: 0.92 NG/ML

## 2024-02-26 DIAGNOSIS — R79.89 LOW TSH LEVEL: Primary | ICD-10-CM

## 2024-02-27 ENCOUNTER — TELEPHONE (OUTPATIENT)
Dept: OBSTETRICS AND GYNECOLOGY | Facility: CLINIC | Age: 39
End: 2024-02-27
Payer: COMMERCIAL

## 2024-02-27 NOTE — TELEPHONE ENCOUNTER
2/27/2024 LEFT VOICE MAIL FOR PATIENT TO RETURN CALL TO RESCHEDULE 3/21/24 APPT WITH DR HOOPER FOR HER GYN FOLLOWUP.  SHE'S IN SURGERY AT THIS TIME.

## 2024-02-28 LAB — 17OHP SERPL-MCNC: 13 NG/DL

## 2024-03-07 ENCOUNTER — OFFICE VISIT (OUTPATIENT)
Dept: FAMILY MEDICINE CLINIC | Facility: CLINIC | Age: 39
End: 2024-03-07
Payer: COMMERCIAL

## 2024-03-07 VITALS
OXYGEN SATURATION: 98 % | DIASTOLIC BLOOD PRESSURE: 64 MMHG | BODY MASS INDEX: 32.49 KG/M2 | HEIGHT: 67 IN | SYSTOLIC BLOOD PRESSURE: 128 MMHG | TEMPERATURE: 97.2 F | WEIGHT: 207 LBS | HEART RATE: 101 BPM

## 2024-03-07 DIAGNOSIS — R73.01 IFG (IMPAIRED FASTING GLUCOSE): ICD-10-CM

## 2024-03-07 DIAGNOSIS — I10 PRIMARY HYPERTENSION: ICD-10-CM

## 2024-03-07 DIAGNOSIS — Z09 FOLLOW-UP EXAM: Primary | ICD-10-CM

## 2024-03-07 DIAGNOSIS — R92.8 ABNORMAL MAMMOGRAM OF RIGHT BREAST: ICD-10-CM

## 2024-03-07 DIAGNOSIS — R79.89 LOW SERUM THYROID STIMULATING HORMONE (TSH): ICD-10-CM

## 2024-03-07 PROCEDURE — 84443 ASSAY THYROID STIM HORMONE: CPT | Performed by: NURSE PRACTITIONER

## 2024-03-07 PROCEDURE — 84479 ASSAY OF THYROID (T3 OR T4): CPT | Performed by: NURSE PRACTITIONER

## 2024-03-07 PROCEDURE — 84436 ASSAY OF TOTAL THYROXINE: CPT | Performed by: NURSE PRACTITIONER

## 2024-03-07 PROCEDURE — 83036 HEMOGLOBIN GLYCOSYLATED A1C: CPT | Performed by: NURSE PRACTITIONER

## 2024-03-07 RX ORDER — ACYCLOVIR 400 MG/1
400 TABLET ORAL 3 TIMES DAILY
Qty: 90 TABLET | Refills: 1 | Status: SHIPPED | OUTPATIENT
Start: 2024-03-07

## 2024-03-07 RX ORDER — AMLODIPINE BESYLATE 5 MG/1
5 TABLET ORAL DAILY
Qty: 90 TABLET | Refills: 0 | Status: SHIPPED | OUTPATIENT
Start: 2024-03-07 | End: 2024-06-05

## 2024-03-07 NOTE — PROGRESS NOTES
"Chief Complaint  Hypertension, Results (Mammogram completed 02/17/2024), and Stress/Anger    Subjective        Medical History: has a past medical history of Asthma, Asthma (03/14/2017), Depression, Essential hypertension (03/14/2017), Hypertension, PCOS (polycystic ovarian syndrome), and STD (female).     Surgical History: has no past surgical history on file.     Family History: family history includes Arthritis in her father and mother; Asthma in her father and mother; Cancer in her maternal grandmother; Hypertension in her father and mother.     Social History: reports that she has never smoked. She has never been exposed to tobacco smoke. She has never used smokeless tobacco. She reports that she does not currently use alcohol. She reports that she does not use drugs.    Renny Cole presents to [facility]  Hypertension  This is a chronic problem. The current episode started more than 1 year ago. The problem has been worse since onset. The problem is resistant. Pertinent negatives include no blurred vision, chest pain, peripheral edema or shortness of breath. Risk factors for coronary artery disease include obesity. Past treatments include diuretics. Current antihypertension treatment includes calcium channel blockers and diuretics.     Patient was seen by Ashley Ortiz 1 month ago for elevated blood pressure at that time patient was on hydrochlorothiazide after evaluation Ashley started patient on amlodipine with the hydrochlorothiazide, patient comes in today for follow-up.    Objective   Vital Signs:   /64 (BP Location: Right arm, Patient Position: Sitting, Cuff Size: Adult)   Pulse 101   Temp 97.2 °F (36.2 °C) (Infrared)   Ht 170.2 cm (67\")   Wt 93.9 kg (207 lb)   SpO2 98%   BMI 32.42 kg/m²       Wt Readings from Last 3 Encounters:   03/07/24 93.9 kg (207 lb)   02/20/24 93.5 kg (206 lb 3.2 oz)   02/07/24 95.1 kg (209 lb 11.2 oz)        BP Readings from " Last 3 Encounters:   03/07/24 128/64   02/20/24 142/82   02/07/24 (!) 182/94       Physical Exam  Vitals reviewed.   Constitutional:       General: She is not in acute distress.     Appearance: Normal appearance. She is well-developed. She is obese. She is not ill-appearing.   HENT:      Head: Normocephalic and atraumatic.   Eyes:      Conjunctiva/sclera: Conjunctivae normal.      Pupils: Pupils are equal, round, and reactive to light.   Cardiovascular:      Rate and Rhythm: Normal rate and regular rhythm.      Heart sounds: No murmur heard.  Pulmonary:      Effort: Pulmonary effort is normal.      Breath sounds: Normal breath sounds. No wheezing or rhonchi.   Musculoskeletal:      Right lower leg: No edema.      Left lower leg: No edema.   Skin:     General: Skin is warm and dry.   Neurological:      Mental Status: She is alert and oriented to person, place, and time.   Psychiatric:         Mood and Affect: Mood and affect normal.         Behavior: Behavior normal.         Thought Content: Thought content normal.         Judgment: Judgment normal.        Result Review :  {The following data was reviewed by LOYDA Stephens on 03/07/2024.  Common labs          2/7/2024    09:40 2/20/2024    16:33 3/7/2024    17:01   Common Labs   Glucose 104      BUN 10      Creatinine 0.53      Sodium 137      Potassium 3.8      Chloride 102      Calcium 9.9      Albumin 4.3      Total Bilirubin 0.4      Alkaline Phosphatase 66      AST (SGOT) 24      ALT (SGPT) 20      WBC 6.19      Hemoglobin 12.5      Hematocrit 39.3      Platelets 348      Total Cholesterol  60     Triglycerides  127     HDL Cholesterol  24     LDL Cholesterol   13     Hemoglobin A1C   5.60      Data reviewed : Most recent office visit with Ashley Ortiz nurse petitioner             No current outpatient medications on file prior to visit.     No current facility-administered medications on file prior to visit.        Assessment and Plan  Diagnoses  and all orders for this visit:    1. Follow-up exam (Primary)    2. Primary hypertension  Comments:  Have started patient on amlodipine.  We will follow-up in 30 days.  Orders:  -     amLODIPine (NORVASC) 5 MG tablet; Take 1 tablet by mouth Daily for 90 days.  Dispense: 90 tablet; Refill: 0    3. Abnormal mammogram of right breast  -     Mammo Diagnostic Digital Tomosynthesis Bilateral With CAD; Future  -     US Breast Right Limited; Future    4. Low serum thyroid stimulating hormone (TSH)  -     Thyroid Panel With TSH  -     NM thyroid uptake and scan; Future  -     US Thyroid; Future    5. IFG (impaired fasting glucose)  -     Hemoglobin A1c    Other orders  -     acyclovir (ZOVIRAX) 400 MG tablet; Take 1 tablet by mouth 3 (Three) Times a Day. Take no more than 5 doses a day.  Dispense: 90 tablet; Refill: 1    Patient was seen by OB/GYN, her TSH was significantly low, TSH was 0.0005, patient's never had a low TSH previously, T4 T3 was not completed.  Will recheck TSH today along with T3-T4 if still low make sure patient follows up with endocrinology.  Discussed with patient the option would likely be endocrinology if labs are still abnormal.  Patient verbalized understanding.  Will also order a thyroid ultrasound with a thyroid uptake if labs continue to be low to rule out hyperthyroidism, goiter, mass or dominant thyroid nodule.    Patient stopped hydrochlorothiazide she is currently on amlodipine 5 mg blood pressure stable at 128/64.  She is denies any headache, chest pain or change in vision.  Will continue on amlodipine 5 mg    Patient would like her A1c checked to rule out diabetes.  She does have a family history, she has had an impaired fasting glucose in the past.    Patient had abnormal right breast mammogram and is in need of a diagnostic mammogram ultrasound with a right breast.  Will order today.    Follow Up   Return in about 6 months (around 9/7/2024) for Recheck.  Patient was given instructions and  counseling regarding her condition or for health maintenance advice. Please see specific information pulled into the AVS if appropriate.       Part of this note may be electronic transcription/translation of spoken language to printed text using the Dragon dictation system

## 2024-03-08 DIAGNOSIS — R79.89 ELEVATED SERUM FREE T4 LEVEL: ICD-10-CM

## 2024-03-08 DIAGNOSIS — E05.90 HYPERTHYROIDISM: Primary | ICD-10-CM

## 2024-03-08 DIAGNOSIS — R79.89 LOW TSH LEVEL: ICD-10-CM

## 2024-03-08 DIAGNOSIS — R79.89 LOW SERUM TRIIODOTHYRONINE (T3): ICD-10-CM

## 2024-03-08 LAB
HBA1C MFR BLD: 5.6 % (ref 4.8–5.6)
T-UPTAKE NFR SERPL: 0.33 TBI (ref 0.8–1.3)
T4 SERPL-MCNC: 23.2 MCG/DL (ref 4.5–11.7)
TSH SERPL DL<=0.05 MIU/L-ACNC: <0.005 UIU/ML (ref 0.27–4.2)

## 2024-03-12 ENCOUNTER — HOSPITAL ENCOUNTER (OUTPATIENT)
Dept: ULTRASOUND IMAGING | Facility: HOSPITAL | Age: 39
Discharge: HOME | End: 2024-03-12
Payer: COMMERCIAL

## 2024-03-12 DIAGNOSIS — R92.8 ABNORMAL MAMMOGRAM OF RIGHT BREAST: ICD-10-CM

## 2024-03-12 DIAGNOSIS — Z78.9 TRYING TO GET PREGNANT: ICD-10-CM

## 2024-03-12 PROCEDURE — 76856 US EXAM PELVIC COMPLETE: CPT

## 2024-03-12 PROCEDURE — 76642 ULTRASOUND BREAST LIMITED: CPT

## 2024-03-12 PROCEDURE — 76830 TRANSVAGINAL US NON-OB: CPT

## 2024-03-18 DIAGNOSIS — R92.8 ABNORMAL MAMMOGRAM OF RIGHT BREAST: ICD-10-CM

## 2024-03-18 DIAGNOSIS — R79.89 LOW SERUM THYROID STIMULATING HORMONE (TSH): Primary | ICD-10-CM

## 2024-03-20 ENCOUNTER — TELEMEDICINE (OUTPATIENT)
Dept: FAMILY MEDICINE CLINIC | Facility: TELEHEALTH | Age: 39
End: 2024-03-20
Payer: COMMERCIAL

## 2024-03-20 VITALS — HEART RATE: 98 BPM | TEMPERATURE: 96.8 F

## 2024-03-20 DIAGNOSIS — A08.4 VIRAL GASTROENTERITIS: Primary | ICD-10-CM

## 2024-03-20 RX ORDER — ONDANSETRON 8 MG/1
8 TABLET, ORALLY DISINTEGRATING ORAL EVERY 8 HOURS PRN
Qty: 12 TABLET | Refills: 0 | Status: SHIPPED | OUTPATIENT
Start: 2024-03-20

## 2024-03-20 NOTE — LETTER
March 20, 2024     Patient: Renny Cole   YOB: 1985   Date of Visit: 3/20/2024       To Whom It May Concern:    It is my medical opinion that Renny Cole may return to work tomorrow on 3/21/2024.            Sincerely,    LOYDA Wilkes    CC:   No Recipients

## 2024-03-20 NOTE — PROGRESS NOTES
Subjective   Chief Complaint   Patient presents with    Vomiting    Headache       Renny Cole is a 38 y.o. female.     History of Present Illness  Urgent care tyto visit. Patient reports waking up today with vomiting and headache.  She has vomited 3 times and had to leave work.  No known sick contacts.  She does not suspect food intake and has not taken any new medications.  She declines testing today.  Vomiting   This is a new problem. The problem occurs 2 to 4 times per day (3 times). There has been no fever. Associated symptoms include headaches. Pertinent negatives include no abdominal pain, arthralgias, chest pain, chills, coughing, diarrhea, dizziness, fever, myalgias, sweats, URI or weight loss. She has tried nothing for the symptoms.   Headache       No Known Allergies    Past Medical History:   Diagnosis Date    Asthma     Asthma 03/14/2017    Depression     Essential hypertension 03/14/2017    Hypertension     PCOS (polycystic ovarian syndrome)     STD (female)     GENITAL HERPES; TRICHOMONIASIS       History reviewed. No pertinent surgical history.    Social History     Socioeconomic History    Marital status: Single   Tobacco Use    Smoking status: Never     Passive exposure: Never    Smokeless tobacco: Never   Vaping Use    Vaping status: Never Used   Substance and Sexual Activity    Alcohol use: Not Currently    Drug use: Never    Sexual activity: Yes     Partners: Male     Birth control/protection: None, Natural family planning/Rhythm       Family History   Problem Relation Age of Onset    Arthritis Father     Asthma Father     Hypertension Father     Arthritis Mother     Asthma Mother     Hypertension Mother     Cancer Maternal Grandmother         unknown    Stroke Neg Hx     Diabetes Neg Hx     Breast cancer Neg Hx     Uterine cancer Neg Hx     Ovarian cancer Neg Hx     Colon cancer Neg Hx          Current Outpatient Medications:     acyclovir (ZOVIRAX) 400 MG tablet, Take 1 tablet by mouth 3  (Three) Times a Day. Take no more than 5 doses a day., Disp: 90 tablet, Rfl: 1    amLODIPine (NORVASC) 5 MG tablet, Take 1 tablet by mouth Daily for 90 days., Disp: 90 tablet, Rfl: 0    ondansetron ODT (ZOFRAN-ODT) 8 MG disintegrating tablet, Place 1 tablet on the tongue Every 8 (Eight) Hours As Needed for Nausea or Vomiting., Disp: 12 tablet, Rfl: 0      Review of Systems   Constitutional:  Positive for fatigue. Negative for chills, diaphoresis, fever and unexpected weight loss.   HENT: Negative.     Respiratory:  Negative for cough, chest tightness, shortness of breath and wheezing.    Cardiovascular:  Negative for chest pain.   Gastrointestinal:  Positive for vomiting. Negative for abdominal distention, abdominal pain, anal bleeding, blood in stool, constipation, diarrhea, nausea, rectal pain, GERD and indigestion.   Musculoskeletal:  Negative for arthralgias and myalgias.   Neurological:  Positive for headache. Negative for dizziness.        Vitals:    03/20/24 0914   Pulse: 98   Temp: 96.8 °F (36 °C)       Objective   Physical Exam  Constitutional:       General: She is not in acute distress.     Appearance: Normal appearance. She is not ill-appearing, toxic-appearing or diaphoretic.   HENT:      Head: Normocephalic.      Nose: Nose normal.      Mouth/Throat:      Lips: Pink.      Mouth: Mucous membranes are moist.   Cardiovascular:      Rate and Rhythm: Normal rate and regular rhythm.   Pulmonary:      Effort: Pulmonary effort is normal.   Abdominal:      Tenderness: There is no abdominal tenderness (per pt).   Neurological:      Mental Status: She is alert and oriented to person, place, and time.          Procedures     Assessment & Plan   Diagnoses and all orders for this visit:    1. Viral gastroenteritis (Primary)  -     ondansetron ODT (ZOFRAN-ODT) 8 MG disintegrating tablet; Place 1 tablet on the tongue Every 8 (Eight) Hours As Needed for Nausea or Vomiting.  Dispense: 12 tablet; Refill:  0            PLAN: Discussed dosing, side effects, recommended other symptomatic care.  Patient should follow up with primary care provider, Urgent Care or ER if symptoms worsen, fail to resolve or other symptoms need attention. Patient/family agree to the above.         LOYDA Wilkes     The use of a video visit has been reviewed with the patient and verbal informed consent has been obtained. Myself and Renny Cole participated in this visit. The patient is located at [address] [place] [address]. I am located in [place], [place]. Mychart and Zoom were utilized.        This visit was performed via Telehealth.  This patient has been instructed to follow-up with their primary care provider if their symptoms worsen or the treatment provided does not resolve their illness.

## 2024-03-24 ENCOUNTER — HOSPITAL ENCOUNTER (OUTPATIENT)
Dept: ULTRASOUND IMAGING | Facility: HOSPITAL | Age: 39
Discharge: HOME | End: 2024-03-24
Admitting: NURSE PRACTITIONER
Payer: COMMERCIAL

## 2024-03-24 DIAGNOSIS — R79.89 LOW SERUM THYROID STIMULATING HORMONE (TSH): ICD-10-CM

## 2024-03-24 PROCEDURE — 76536 US EXAM OF HEAD AND NECK: CPT

## 2024-03-25 DIAGNOSIS — E07.9 THYROID MASS: Primary | ICD-10-CM

## 2024-03-25 NOTE — PROGRESS NOTES
Called and spoke to Renny Cole to relay result and new order placement. Pt verbalized understanding. No further questions/concerns at this time.

## 2024-03-27 ENCOUNTER — HOSPITAL ENCOUNTER (OUTPATIENT)
Dept: NUCLEAR MEDICINE | Facility: HOSPITAL | Age: 39
Discharge: HOME | End: 2024-03-27
Payer: COMMERCIAL

## 2024-03-27 DIAGNOSIS — R79.89 LOW SERUM THYROID STIMULATING HORMONE (TSH): ICD-10-CM

## 2024-03-27 PROCEDURE — A9516 IODINE I-123 SOD IODIDE MIC: HCPCS | Performed by: NURSE PRACTITIONER

## 2024-03-27 PROCEDURE — 0 SODIUM IODIDE 3.7 MBQ CAPSULE: Performed by: NURSE PRACTITIONER

## 2024-03-27 PROCEDURE — 78014 THYROID IMAGING W/BLOOD FLOW: CPT

## 2024-03-27 RX ORDER — SODIUM IODIDE I 123 100 UCI/1
1 CAPSULE, GELATIN COATED ORAL
Status: COMPLETED | OUTPATIENT
Start: 2024-03-27 | End: 2024-03-27

## 2024-03-27 RX ADMIN — SODIUM IODIDE I 123 1 CAPSULE: 100 CAPSULE, GELATIN COATED ORAL at 13:24

## 2024-03-28 ENCOUNTER — HOSPITAL ENCOUNTER (OUTPATIENT)
Dept: NUCLEAR MEDICINE | Facility: HOSPITAL | Age: 39
Discharge: HOME | End: 2024-03-28
Payer: COMMERCIAL

## 2024-04-10 ENCOUNTER — HOSPITAL ENCOUNTER (OUTPATIENT)
Dept: ULTRASOUND IMAGING | Facility: HOSPITAL | Age: 39
Discharge: HOME | End: 2024-04-10
Admitting: NURSE PRACTITIONER
Payer: COMMERCIAL

## 2024-04-10 DIAGNOSIS — E07.9 THYROID MASS: ICD-10-CM

## 2024-04-10 PROCEDURE — 25010000002 LIDOCAINE 1 % SOLUTION: Performed by: NURSE PRACTITIONER

## 2024-04-10 PROCEDURE — 76942 ECHO GUIDE FOR BIOPSY: CPT

## 2024-04-10 PROCEDURE — 88173 CYTOPATH EVAL FNA REPORT: CPT | Performed by: NURSE PRACTITIONER

## 2024-04-10 RX ORDER — LIDOCAINE HYDROCHLORIDE 10 MG/ML
10 INJECTION, SOLUTION INFILTRATION; PERINEURAL ONCE
Status: COMPLETED | OUTPATIENT
Start: 2024-04-10 | End: 2024-04-10

## 2024-04-10 RX ADMIN — LIDOCAINE HYDROCHLORIDE 10 ML: 10 INJECTION, SOLUTION INFILTRATION; PERINEURAL at 12:50

## 2024-04-12 LAB
CYTO UR: NORMAL
LAB AP CASE REPORT: NORMAL
LAB AP CLINICAL INFORMATION: NORMAL
PATH REPORT.FINAL DX SPEC: NORMAL
PATH REPORT.GROSS SPEC: NORMAL

## 2024-04-12 NOTE — PROGRESS NOTES
"GYN Visit    Chief Complaint   Patient presents with    Follow-up       HPI:   38 y.o. LMP: Patient's last menstrual period was 2024 (exact date). Patient presents to follow-up on ultrasound findings.  She states her cycle has been regular for the last 3 months.  She states she has normal cramping with her cycles.    Social History     Substance and Sexual Activity   Sexual Activity Yes    Partners: Male    Birth control/protection: None, Natural family planning/Rhythm       Mammo Screening Digital Tomosynthesis Bilateral With CAD (2024 10:22)    US Pelvis Transvaginal Non OB (2024 12:44)    US Breast Right Limited (2024 13:10)    History: PMHx, Meds, Allergies, PSHx, Social Hx, and POBHx all reviewed and updated.  Last Completed Pap Smear            PAP SMEAR (Every 3 Years) Next due on 2022  IgP, Aptima HPV    2021  IgP, Aptima HPV    2016  Outside Procedure: CHG CYTOPATH CERV/VAG THIN LAYER                    PHYSICAL EXAM:  /87   Pulse 104   Ht 170.2 cm (67\")   Wt 87.1 kg (192 lb)   LMP 2024 (Exact Date)   Breastfeeding No   BMI 30.07 kg/m²   General- NAD, alert and oriented, appropriate  Psych- Normal mood  Respiratory- No labored breathing  Abdomen- Soft, non distended, non tender      Extremities- No edema  Skin- No lesions, no rashes      ASSESSMENT AND PLAN:  Diagnoses and all orders for this visit:    1. Cyst of left ovary (Primary)  -     US Pelvis Transvaginal Non OB; Future    Ultrasound shows complex left ovarian cyst possible hemorrhagic versus endometrioma.  Recommend repeat ultrasound in about 3 months        Follow Up:  Return if symptoms worsen or fail to improve.        Shiloh Mercado,   2024    [facility]  [facility] [place] [address]  Dept: [phone]  Dept Fax: [phone]  Loc: [phone]  Loc Fax: [phone]     "

## 2024-04-16 ENCOUNTER — OFFICE VISIT (OUTPATIENT)
Dept: OBSTETRICS AND GYNECOLOGY | Facility: CLINIC | Age: 39
End: 2024-04-16
Payer: COMMERCIAL

## 2024-04-16 VITALS
SYSTOLIC BLOOD PRESSURE: 143 MMHG | WEIGHT: 192 LBS | HEIGHT: 67 IN | DIASTOLIC BLOOD PRESSURE: 87 MMHG | BODY MASS INDEX: 30.13 KG/M2 | HEART RATE: 104 BPM

## 2024-04-16 DIAGNOSIS — N83.202 CYST OF LEFT OVARY: Primary | ICD-10-CM

## 2024-04-16 PROCEDURE — 1160F RVW MEDS BY RX/DR IN RCRD: CPT | Performed by: STUDENT IN AN ORGANIZED HEALTH CARE EDUCATION/TRAINING PROGRAM

## 2024-04-16 PROCEDURE — 99213 OFFICE O/P EST LOW 20 MIN: CPT | Performed by: STUDENT IN AN ORGANIZED HEALTH CARE EDUCATION/TRAINING PROGRAM

## 2024-04-16 PROCEDURE — 1159F MED LIST DOCD IN RCRD: CPT | Performed by: STUDENT IN AN ORGANIZED HEALTH CARE EDUCATION/TRAINING PROGRAM

## 2024-04-18 ENCOUNTER — TELEPHONE (OUTPATIENT)
Dept: OBSTETRICS AND GYNECOLOGY | Facility: CLINIC | Age: 39
End: 2024-04-18
Payer: COMMERCIAL

## 2024-04-30 PROCEDURE — 87591 N.GONORRHOEAE DNA AMP PROB: CPT | Performed by: NURSE PRACTITIONER

## 2024-04-30 PROCEDURE — 87510 GARDNER VAG DNA DIR PROBE: CPT | Performed by: NURSE PRACTITIONER

## 2024-04-30 PROCEDURE — 87660 TRICHOMONAS VAGIN DIR PROBE: CPT | Performed by: NURSE PRACTITIONER

## 2024-04-30 PROCEDURE — 87480 CANDIDA DNA DIR PROBE: CPT | Performed by: NURSE PRACTITIONER

## 2024-04-30 PROCEDURE — 87491 CHLMYD TRACH DNA AMP PROBE: CPT | Performed by: NURSE PRACTITIONER

## 2024-05-16 PROCEDURE — 87086 URINE CULTURE/COLONY COUNT: CPT | Performed by: PHYSICIAN ASSISTANT

## 2024-05-20 ENCOUNTER — OFFICE VISIT (OUTPATIENT)
Dept: FAMILY MEDICINE CLINIC | Facility: CLINIC | Age: 39
End: 2024-05-20
Payer: COMMERCIAL

## 2024-05-20 VITALS
HEIGHT: 67 IN | WEIGHT: 195 LBS | OXYGEN SATURATION: 98 % | SYSTOLIC BLOOD PRESSURE: 168 MMHG | DIASTOLIC BLOOD PRESSURE: 92 MMHG | HEART RATE: 87 BPM | TEMPERATURE: 97.3 F | BODY MASS INDEX: 30.61 KG/M2

## 2024-05-20 DIAGNOSIS — Z11.3 SCREENING EXAMINATION FOR STD (SEXUALLY TRANSMITTED DISEASE): Primary | ICD-10-CM

## 2024-05-20 LAB
C TRACH RRNA CVX QL NAA+PROBE: NOT DETECTED
CANDIDA SPECIES: NEGATIVE
GARDNERELLA VAGINALIS: POSITIVE
HAV IGM SERPL QL IA: NORMAL
HBV CORE IGM SERPL QL IA: NORMAL
HBV SURFACE AG SERPL QL IA: NORMAL
HCV AB SER QL: NORMAL
HIV 1+2 AB+HIV1 P24 AG SERPL QL IA: NORMAL
N GONORRHOEA RRNA SPEC QL NAA+PROBE: NOT DETECTED
T VAGINALIS DNA VAG QL PROBE+SIG AMP: NEGATIVE

## 2024-05-20 PROCEDURE — 87510 GARDNER VAG DNA DIR PROBE: CPT | Performed by: NURSE PRACTITIONER

## 2024-05-20 PROCEDURE — 99213 OFFICE O/P EST LOW 20 MIN: CPT | Performed by: NURSE PRACTITIONER

## 2024-05-20 PROCEDURE — G0432 EIA HIV-1/HIV-2 SCREEN: HCPCS | Performed by: NURSE PRACTITIONER

## 2024-05-20 PROCEDURE — 1159F MED LIST DOCD IN RCRD: CPT | Performed by: NURSE PRACTITIONER

## 2024-05-20 PROCEDURE — 87660 TRICHOMONAS VAGIN DIR PROBE: CPT | Performed by: NURSE PRACTITIONER

## 2024-05-20 PROCEDURE — 86592 SYPHILIS TEST NON-TREP QUAL: CPT | Performed by: NURSE PRACTITIONER

## 2024-05-20 PROCEDURE — 87591 N.GONORRHOEAE DNA AMP PROB: CPT | Performed by: NURSE PRACTITIONER

## 2024-05-20 PROCEDURE — 80074 ACUTE HEPATITIS PANEL: CPT | Performed by: NURSE PRACTITIONER

## 2024-05-20 PROCEDURE — 1160F RVW MEDS BY RX/DR IN RCRD: CPT | Performed by: NURSE PRACTITIONER

## 2024-05-20 PROCEDURE — 1126F AMNT PAIN NOTED NONE PRSNT: CPT | Performed by: NURSE PRACTITIONER

## 2024-05-20 PROCEDURE — 87491 CHLMYD TRACH DNA AMP PROBE: CPT | Performed by: NURSE PRACTITIONER

## 2024-05-20 PROCEDURE — 87480 CANDIDA DNA DIR PROBE: CPT | Performed by: NURSE PRACTITIONER
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
## 2024-05-21 LAB — RPR SER QL: NORMAL

## 2024-05-22 RX ORDER — METRONIDAZOLE 7.5 MG/G
1 GEL VAGINAL NIGHTLY
Qty: 70 G | Refills: 0 | Status: SHIPPED | OUTPATIENT
Start: 2024-05-22 | End: 2024-06-05

## 2024-07-11 ENCOUNTER — TELEPHONE (OUTPATIENT)
Dept: FAMILY MEDICINE CLINIC | Facility: CLINIC | Age: 39
End: 2024-07-11
Payer: COMMERCIAL

## 2024-07-11 NOTE — TELEPHONE ENCOUNTER
Caller: Renny Cole    Relationship to patient: Self    Best call back number: [phone]     Patient is needing: PATIENT IS REQUESTING AN APPOINTMENT FOR AN STD CHECK ASAP.    HUB UNABLE TO SCHEDULE IN TIMEFRAME REQUESTED.    PLEASE CONTACT TO ADVISE.         SABINA YES OR CALL MAY LEAVE VOICEMAIL.

## 2024-07-12 ENCOUNTER — CLINICAL SUPPORT (OUTPATIENT)
Dept: FAMILY MEDICINE CLINIC | Facility: CLINIC | Age: 39
End: 2024-07-12
Payer: COMMERCIAL

## 2024-07-12 DIAGNOSIS — Z11.3 SCREENING EXAMINATION FOR STD (SEXUALLY TRANSMITTED DISEASE): Primary | ICD-10-CM

## 2024-07-12 LAB
CANDIDA SPECIES: NEGATIVE
GARDNERELLA VAGINALIS: NEGATIVE
T VAGINALIS DNA VAG QL PROBE+SIG AMP: NEGATIVE

## 2024-07-12 PROCEDURE — 87510 GARDNER VAG DNA DIR PROBE: CPT | Performed by: NURSE PRACTITIONER

## 2024-07-12 PROCEDURE — 87480 CANDIDA DNA DIR PROBE: CPT | Performed by: NURSE PRACTITIONER

## 2024-07-12 PROCEDURE — 87660 TRICHOMONAS VAGIN DIR PROBE: CPT | Performed by: NURSE PRACTITIONER

## 2024-09-17 ENCOUNTER — HOSPITAL ENCOUNTER (OUTPATIENT)
Dept: ULTRASOUND IMAGING | Facility: HOSPITAL | Age: 39
Discharge: HOME | End: 2024-09-17
Payer: COMMERCIAL

## 2024-09-17 ENCOUNTER — HOSPITAL ENCOUNTER (OUTPATIENT)
Dept: MAMMOGRAPHY | Facility: HOSPITAL | Age: 39
Discharge: HOME | End: 2024-09-17
Payer: COMMERCIAL

## 2024-09-17 DIAGNOSIS — R92.8 ABNORMAL MAMMOGRAM OF RIGHT BREAST: ICD-10-CM

## 2024-09-17 PROCEDURE — G0279 TOMOSYNTHESIS, MAMMO: HCPCS

## 2024-09-17 PROCEDURE — 77065 DX MAMMO INCL CAD UNI: CPT

## 2024-09-17 PROCEDURE — 76642 ULTRASOUND BREAST LIMITED: CPT

## 2024-10-28 PROCEDURE — 87660 TRICHOMONAS VAGIN DIR PROBE: CPT | Performed by: FAMILY MEDICINE

## 2024-10-28 PROCEDURE — 87491 CHLMYD TRACH DNA AMP PROBE: CPT | Performed by: FAMILY MEDICINE

## 2024-10-28 PROCEDURE — 87480 CANDIDA DNA DIR PROBE: CPT | Performed by: FAMILY MEDICINE

## 2024-10-28 PROCEDURE — 87591 N.GONORRHOEAE DNA AMP PROB: CPT | Performed by: FAMILY MEDICINE

## 2024-10-28 PROCEDURE — 87510 GARDNER VAG DNA DIR PROBE: CPT | Performed by: FAMILY MEDICINE

## 2024-12-02 RX ORDER — ACYCLOVIR 400 MG/1
TABLET ORAL
Qty: 90 TABLET | Refills: 1 | Status: SHIPPED | OUTPATIENT
Start: 2024-12-02

## 2024-12-20 ENCOUNTER — TELEPHONE (OUTPATIENT)
Dept: OBSTETRICS AND GYNECOLOGY | Facility: CLINIC | Age: 39
End: 2024-12-20
Payer: OTHER MISCELLANEOUS

## 2024-12-20 NOTE — TELEPHONE ENCOUNTER
The patient was a no-show to the pelvic ultrasound appointment that was scheduled for her on 07/16/2024.  I called her and she requested the number to Central Scheduling so that she could get this rescheduled.  I gave that to her and told her to call us back once she has made the ultrasound appointment so that I can get her in to see Dr. Mercado to go over those results.  The pt voiced understanding of this information.

## 2025-02-06 ENCOUNTER — PATIENT MESSAGE (OUTPATIENT)
Dept: OBSTETRICS AND GYNECOLOGY | Facility: CLINIC | Age: 40
End: 2025-02-06

## 2025-02-12 ENCOUNTER — PATIENT MESSAGE (OUTPATIENT)
Dept: OBSTETRICS AND GYNECOLOGY | Facility: CLINIC | Age: 40
End: 2025-02-12
Payer: OTHER MISCELLANEOUS

## 2025-02-20 PROCEDURE — 87510 GARDNER VAG DNA DIR PROBE: CPT | Performed by: NURSE PRACTITIONER

## 2025-02-20 PROCEDURE — 87491 CHLMYD TRACH DNA AMP PROBE: CPT | Performed by: NURSE PRACTITIONER

## 2025-02-20 PROCEDURE — 87591 N.GONORRHOEAE DNA AMP PROB: CPT | Performed by: NURSE PRACTITIONER

## 2025-02-20 PROCEDURE — 87660 TRICHOMONAS VAGIN DIR PROBE: CPT | Performed by: NURSE PRACTITIONER

## 2025-02-20 PROCEDURE — 87480 CANDIDA DNA DIR PROBE: CPT | Performed by: NURSE PRACTITIONER

## 2025-02-21 ENCOUNTER — TELEPHONE (OUTPATIENT)
Dept: URGENT CARE | Facility: CLINIC | Age: 40
End: 2025-02-21
Payer: OTHER MISCELLANEOUS

## 2025-02-22 ENCOUNTER — TELEPHONE (OUTPATIENT)
Dept: URGENT CARE | Facility: CLINIC | Age: 40
End: 2025-02-22
Payer: OTHER MISCELLANEOUS

## 2025-02-22 DIAGNOSIS — B96.89 GARDNERELLA ASSOCIATED VAGINAL DISCHARGE: Primary | ICD-10-CM

## 2025-02-22 DIAGNOSIS — N76.0 GARDNERELLA ASSOCIATED VAGINAL DISCHARGE: Primary | ICD-10-CM

## 2025-02-22 NOTE — TELEPHONE ENCOUNTER
This is the third time attempted to call the patient with test results.  Left message to call back on Sunday morning at 9 AM.

## 2025-02-22 NOTE — TELEPHONE ENCOUNTER
I attempted to call patient with results.  Left message to call back facility for important test results.

## 2025-02-26 ENCOUNTER — TELEPHONE (OUTPATIENT)
Dept: URGENT CARE | Facility: CLINIC | Age: 40
End: 2025-02-26
Payer: OTHER MISCELLANEOUS

## 2025-02-26 DIAGNOSIS — N76.0 GARDNERELLA ASSOCIATED VAGINAL DISCHARGE: Primary | ICD-10-CM

## 2025-02-26 DIAGNOSIS — B96.89 GARDNERELLA ASSOCIATED VAGINAL DISCHARGE: Primary | ICD-10-CM

## 2025-02-26 RX ORDER — METRONIDAZOLE 500 MG/1
500 TABLET ORAL 2 TIMES DAILY
Qty: 14 TABLET | Refills: 0 | Status: SHIPPED | OUTPATIENT
Start: 2025-02-26 | End: 2025-03-05

## 2025-02-26 NOTE — TELEPHONE ENCOUNTER
Contacted patient.  Patient identified by name and date of birth.  Discussed her test results.  Patient is completed her Augmentin at this time.  I am going to start her on Flagyl 500 mg 1 tablet twice a day for 7 days.  I did instruct her not to use any alcoholic beverages while on Flagyl as this could cause her to become very very sick.  She verbalized understanding.  She did not have any questions.  Medication sent to [facility].

## 2025-03-12 NOTE — TELEPHONE ENCOUNTER
Caller: Renny Cole    Relationship: Self    Best call back number: [phone]     Requested Prescriptions:   Requested Prescriptions     Pending Prescriptions Disp Refills    acyclovir (ZOVIRAX) 400 MG tablet 90 tablet 1     Sig: Take no more than 5 doses a day.        Pharmacy where request should be sent: [id number] - [place], [place] - [address] [facility] [facility] AT [address]/[facility] & [place] - [phone] [facility] [phone] FX     Last office visit with prescribing clinician: 5/20/2024   Last telemedicine visit with prescribing clinician: Visit date not found   Next office visit with prescribing clinician: Visit date not found     Additional details provided by patient: OUT OF MEDICATION    CONTACT PATIENT WHEN COMPLETE.    Does the patient have less than a 3 day supply:  [x] Yes  [] No    Would you like a call back once the refill request has been completed: [x] Yes [] No    If the office needs to give you a call back, can they leave a voicemail: [x] Yes [] No    Lina Villafuerte Rep   03/12/25 12:56 EDT

## 2025-03-13 RX ORDER — ACYCLOVIR 400 MG/1
400 TABLET ORAL
Qty: 90 TABLET | Refills: 1 | Status: SHIPPED | OUTPATIENT
Start: 2025-03-13

## 2025-08-20 RX ORDER — ACYCLOVIR 400 MG/1
TABLET ORAL
Qty: 90 TABLET | Refills: 1 | OUTPATIENT
Start: 2025-08-20

## 2025-08-25 RX ORDER — ACYCLOVIR 400 MG/1
400 TABLET ORAL
Qty: 90 TABLET | Refills: 1 | OUTPATIENT
Start: 2025-08-25

## 2025-08-26 RX ORDER — ACYCLOVIR 400 MG/1
400 TABLET ORAL
Qty: 90 TABLET | Refills: 1 | OUTPATIENT
Start: 2025-08-26